# Patient Record
Sex: FEMALE | Race: WHITE | NOT HISPANIC OR LATINO | URBAN - METROPOLITAN AREA
[De-identification: names, ages, dates, MRNs, and addresses within clinical notes are randomized per-mention and may not be internally consistent; named-entity substitution may affect disease eponyms.]

---

## 2020-03-04 ENCOUNTER — EMERGENCY (EMERGENCY)
Facility: HOSPITAL | Age: 30
LOS: 0 days | Discharge: HOME | End: 2020-03-05
Attending: EMERGENCY MEDICINE | Admitting: HOSPITALIST
Payer: COMMERCIAL

## 2020-03-04 VITALS
DIASTOLIC BLOOD PRESSURE: 71 MMHG | OXYGEN SATURATION: 100 % | RESPIRATION RATE: 16 BRPM | SYSTOLIC BLOOD PRESSURE: 162 MMHG | TEMPERATURE: 99 F | HEART RATE: 120 BPM

## 2020-03-04 VITALS
OXYGEN SATURATION: 100 % | SYSTOLIC BLOOD PRESSURE: 96 MMHG | TEMPERATURE: 101 F | DIASTOLIC BLOOD PRESSURE: 48 MMHG | RESPIRATION RATE: 18 BRPM | HEART RATE: 117 BPM

## 2020-03-04 DIAGNOSIS — J11.1 INFLUENZA DUE TO UNIDENTIFIED INFLUENZA VIRUS WITH OTHER RESPIRATORY MANIFESTATIONS: ICD-10-CM

## 2020-03-04 DIAGNOSIS — R11.2 NAUSEA WITH VOMITING, UNSPECIFIED: ICD-10-CM

## 2020-03-04 DIAGNOSIS — R50.9 FEVER, UNSPECIFIED: ICD-10-CM

## 2020-03-04 LAB
ALBUMIN SERPL ELPH-MCNC: 3.7 G/DL — SIGNIFICANT CHANGE UP (ref 3.5–5.2)
ALP SERPL-CCNC: 42 U/L — SIGNIFICANT CHANGE UP (ref 30–115)
ALT FLD-CCNC: 26 U/L — SIGNIFICANT CHANGE UP (ref 0–41)
ANION GAP SERPL CALC-SCNC: 13 MMOL/L — SIGNIFICANT CHANGE UP (ref 7–14)
APPEARANCE UR: CLEAR — SIGNIFICANT CHANGE UP
AST SERPL-CCNC: 21 U/L — SIGNIFICANT CHANGE UP (ref 0–41)
BACTERIA # UR AUTO: NEGATIVE — SIGNIFICANT CHANGE UP
BASOPHILS # BLD AUTO: 0.02 K/UL — SIGNIFICANT CHANGE UP (ref 0–0.2)
BASOPHILS NFR BLD AUTO: 0.4 % — SIGNIFICANT CHANGE UP (ref 0–1)
BILIRUB SERPL-MCNC: 0.9 MG/DL — SIGNIFICANT CHANGE UP (ref 0.2–1.2)
BILIRUB UR-MCNC: NEGATIVE — SIGNIFICANT CHANGE UP
BUN SERPL-MCNC: 7 MG/DL — LOW (ref 10–20)
CALCIUM SERPL-MCNC: 8.5 MG/DL — SIGNIFICANT CHANGE UP (ref 8.5–10.1)
CHLORIDE SERPL-SCNC: 104 MMOL/L — SIGNIFICANT CHANGE UP (ref 98–110)
CO2 SERPL-SCNC: 21 MMOL/L — SIGNIFICANT CHANGE UP (ref 17–32)
COLOR SPEC: YELLOW — SIGNIFICANT CHANGE UP
CREAT SERPL-MCNC: 0.6 MG/DL — LOW (ref 0.7–1.5)
DIFF PNL FLD: NEGATIVE — SIGNIFICANT CHANGE UP
EOSINOPHIL # BLD AUTO: 0 K/UL — SIGNIFICANT CHANGE UP (ref 0–0.7)
EOSINOPHIL NFR BLD AUTO: 0 % — SIGNIFICANT CHANGE UP (ref 0–8)
EPI CELLS # UR: 1 /HPF — SIGNIFICANT CHANGE UP (ref 0–5)
FLU A RESULT: POSITIVE
FLU A RESULT: POSITIVE
FLUAV AG NPH QL: POSITIVE
FLUBV AG NPH QL: NEGATIVE — SIGNIFICANT CHANGE UP
GLUCOSE SERPL-MCNC: 78 MG/DL — SIGNIFICANT CHANGE UP (ref 70–99)
GLUCOSE UR QL: NEGATIVE — SIGNIFICANT CHANGE UP
HCT VFR BLD CALC: 33.7 % — LOW (ref 37–47)
HGB BLD-MCNC: 12.1 G/DL — SIGNIFICANT CHANGE UP (ref 12–16)
HYALINE CASTS # UR AUTO: 1 /LPF — SIGNIFICANT CHANGE UP (ref 0–7)
IMM GRANULOCYTES NFR BLD AUTO: 1.4 % — HIGH (ref 0.1–0.3)
KETONES UR-MCNC: ABNORMAL
LEUKOCYTE ESTERASE UR-ACNC: NEGATIVE — SIGNIFICANT CHANGE UP
LIDOCAIN IGE QN: 12 U/L — SIGNIFICANT CHANGE UP (ref 7–60)
LYMPHOCYTES # BLD AUTO: 0.49 K/UL — LOW (ref 1.2–3.4)
LYMPHOCYTES # BLD AUTO: 10.1 % — LOW (ref 20.5–51.1)
MAGNESIUM SERPL-MCNC: 1.7 MG/DL — LOW (ref 1.8–2.4)
MCHC RBC-ENTMCNC: 30.6 PG — SIGNIFICANT CHANGE UP (ref 27–31)
MCHC RBC-ENTMCNC: 35.9 G/DL — SIGNIFICANT CHANGE UP (ref 32–37)
MCV RBC AUTO: 85.3 FL — SIGNIFICANT CHANGE UP (ref 81–99)
MONOCYTES # BLD AUTO: 0.48 K/UL — SIGNIFICANT CHANGE UP (ref 0.1–0.6)
MONOCYTES NFR BLD AUTO: 9.9 % — HIGH (ref 1.7–9.3)
NEUTROPHILS # BLD AUTO: 3.81 K/UL — SIGNIFICANT CHANGE UP (ref 1.4–6.5)
NEUTROPHILS NFR BLD AUTO: 78.2 % — HIGH (ref 42.2–75.2)
NITRITE UR-MCNC: NEGATIVE — SIGNIFICANT CHANGE UP
NRBC # BLD: 0 /100 WBCS — SIGNIFICANT CHANGE UP (ref 0–0)
PH UR: 6.5 — SIGNIFICANT CHANGE UP (ref 5–8)
PLATELET # BLD AUTO: 159 K/UL — SIGNIFICANT CHANGE UP (ref 130–400)
POTASSIUM SERPL-MCNC: 3.9 MMOL/L — SIGNIFICANT CHANGE UP (ref 3.5–5)
POTASSIUM SERPL-SCNC: 3.9 MMOL/L — SIGNIFICANT CHANGE UP (ref 3.5–5)
PROT SERPL-MCNC: 5.8 G/DL — LOW (ref 6–8)
PROT UR-MCNC: ABNORMAL
RBC # BLD: 3.95 M/UL — LOW (ref 4.2–5.4)
RBC # FLD: 12.9 % — SIGNIFICANT CHANGE UP (ref 11.5–14.5)
RBC CASTS # UR COMP ASSIST: 2 /HPF — SIGNIFICANT CHANGE UP (ref 0–4)
RSV RESULT: NEGATIVE — SIGNIFICANT CHANGE UP
RSV RNA RESP QL NAA+PROBE: NEGATIVE — SIGNIFICANT CHANGE UP
SODIUM SERPL-SCNC: 138 MMOL/L — SIGNIFICANT CHANGE UP (ref 135–146)
SP GR SPEC: 1.03 — HIGH (ref 1.01–1.02)
UROBILINOGEN FLD QL: SIGNIFICANT CHANGE UP
WBC # BLD: 4.87 K/UL — SIGNIFICANT CHANGE UP (ref 4.8–10.8)
WBC # FLD AUTO: 4.87 K/UL — SIGNIFICANT CHANGE UP (ref 4.8–10.8)
WBC UR QL: 2 /HPF — SIGNIFICANT CHANGE UP (ref 0–5)

## 2020-03-04 PROCEDURE — 99285 EMERGENCY DEPT VISIT HI MDM: CPT

## 2020-03-04 PROCEDURE — 76705 ECHO EXAM OF ABDOMEN: CPT | Mod: 26

## 2020-03-04 PROCEDURE — 76815 OB US LIMITED FETUS(S): CPT | Mod: 26

## 2020-03-04 RX ORDER — METOCLOPRAMIDE HCL 10 MG
10 TABLET ORAL ONCE
Refills: 0 | Status: COMPLETED | OUTPATIENT
Start: 2020-03-04 | End: 2020-03-04

## 2020-03-04 RX ORDER — SODIUM CHLORIDE 9 MG/ML
1000 INJECTION INTRAMUSCULAR; INTRAVENOUS; SUBCUTANEOUS ONCE
Refills: 0 | Status: COMPLETED | OUTPATIENT
Start: 2020-03-04 | End: 2020-03-04

## 2020-03-04 RX ORDER — SODIUM CHLORIDE 9 MG/ML
2000 INJECTION INTRAMUSCULAR; INTRAVENOUS; SUBCUTANEOUS ONCE
Refills: 0 | Status: COMPLETED | OUTPATIENT
Start: 2020-03-04 | End: 2020-03-04

## 2020-03-04 RX ORDER — METOCLOPRAMIDE HCL 10 MG
1 TABLET ORAL
Qty: 14 | Refills: 0
Start: 2020-03-04 | End: 2020-03-10

## 2020-03-04 RX ORDER — ONDANSETRON 8 MG/1
4 TABLET, FILM COATED ORAL ONCE
Refills: 0 | Status: COMPLETED | OUTPATIENT
Start: 2020-03-04 | End: 2020-03-04

## 2020-03-04 RX ORDER — MAGNESIUM OXIDE 400 MG ORAL TABLET 241.3 MG
400 TABLET ORAL ONCE
Refills: 0 | Status: COMPLETED | OUTPATIENT
Start: 2020-03-04 | End: 2020-03-04

## 2020-03-04 RX ORDER — ACETAMINOPHEN 500 MG
975 TABLET ORAL ONCE
Refills: 0 | Status: COMPLETED | OUTPATIENT
Start: 2020-03-04 | End: 2020-03-04

## 2020-03-04 RX ADMIN — Medication 104 MILLIGRAM(S): at 23:12

## 2020-03-04 RX ADMIN — SODIUM CHLORIDE 1000 MILLILITER(S): 9 INJECTION INTRAMUSCULAR; INTRAVENOUS; SUBCUTANEOUS at 13:57

## 2020-03-04 RX ADMIN — Medication 75 MILLIGRAM(S): at 18:17

## 2020-03-04 RX ADMIN — MAGNESIUM OXIDE 400 MG ORAL TABLET 400 MILLIGRAM(S): 241.3 TABLET ORAL at 18:16

## 2020-03-04 RX ADMIN — SODIUM CHLORIDE 1000 MILLILITER(S): 9 INJECTION INTRAMUSCULAR; INTRAVENOUS; SUBCUTANEOUS at 22:51

## 2020-03-04 RX ADMIN — SODIUM CHLORIDE 1000 MILLILITER(S): 9 INJECTION INTRAMUSCULAR; INTRAVENOUS; SUBCUTANEOUS at 16:50

## 2020-03-04 RX ADMIN — Medication 975 MILLIGRAM(S): at 16:05

## 2020-03-04 RX ADMIN — ONDANSETRON 4 MILLIGRAM(S): 8 TABLET, FILM COATED ORAL at 15:51

## 2020-03-04 RX ADMIN — SODIUM CHLORIDE 2000 MILLILITER(S): 9 INJECTION INTRAMUSCULAR; INTRAVENOUS; SUBCUTANEOUS at 18:17

## 2020-03-04 RX ADMIN — Medication 975 MILLIGRAM(S): at 17:51

## 2020-03-04 RX ADMIN — SODIUM CHLORIDE 1000 MILLILITER(S): 9 INJECTION INTRAMUSCULAR; INTRAVENOUS; SUBCUTANEOUS at 16:49

## 2020-03-04 NOTE — CONSULT NOTE ADULT - ASSESSMENT
30yo  at 19w2d GA with influenza A and likely physiological tachycardia, currently clinically and hemodynamically stable, requiring no acute OB interventions       INCOMPLETE: RECOMMENDATIONS TO FOLLOW 30yo  at 19w2d GA with influenza A and likely physiological tachycardia, currently clinically and hemodynamically stable, requiring no acute OB interventions   -recommend tamiflu for influenza A  -recommend zofran and reglan prn for nausea  -encourage PO maternal hydration    -follow up with PMD at next scheduled prenatal appointment   -dispo per ED         Dr. Barber and Dr. Canales aware 30yo  at 19w2d GA with influenza A and likely physiological tachycardia, currently clinically and hemodynamically stable, requiring no acute OB interventions and may be discharged from an obstetrical standpoint   -recommend tamiflu for influenza A  -recommend zofran and reglan prn for nausea  -encourage PO maternal hydration    -follow up with PMD at next scheduled prenatal appointment   -dispo per ED         Dr. Barber and Dr. Canales aware 30yo  at 19w2d GA with influenza A and likely physiological tachycardia, currently clinically and hemodynamically stable, requiring no acute OB interventions and may be discharged from an obstetrical standpoint   -recommend tamiflu for influenza A  -recommend zofran and reglan prn for nausea  -encourage PO maternal hydration    -follow up with PMD at next scheduled prenatal appointment   -dispo per ED       Dr. Barber and Dr. Canales aware

## 2020-03-04 NOTE — ED PROVIDER NOTE - OBJECTIVE STATEMENT
29y F  19wk followed by Dr. Canales presents for eval of fever. Pt has 1 day of fever tmax 103f, relived with tylenol, assoicated n/v, aggravated with eating. Endorses body aching and R sided pelvic pain. Denies ha, cp, sob, weakness, numbness, dc, bleeding, dysuria, cough

## 2020-03-04 NOTE — ED PROVIDER NOTE - ATTENDING CONTRIBUTION TO CARE
28 yo f, 19 weeks pregnant, , sent by dr. navarro for fever x 1, improving with tylenol.  also with n/v.  +diffuse body aches and mild lower abd pain.   mild cough.  no sob, no cp, no vaginal bleeding or discharge.  exam: nad, ncat, perrl, eomi, mmm, rrr, ctab, abd soft, gravid, mildly ttp rlq, no rebound, no guarding, no cvat imp: pt with fever x 1 day.  r/o flu, ob consult

## 2020-03-04 NOTE — ED PROVIDER NOTE - PHYSICAL EXAMINATION
CONST: Pt appears uncomfortable  EYES: Sclera and conjunctiva clear.   ENT: Clear nasal discharge. Oropharynx normal appearing, no erythema or exudates. No abscess or swelling. Uvula midline.   NECK: Non-tender, no meningeal signs. normal ROM. supple   CARD: Tachycardic S1 S2; No jvd  RESP: Equal BS B/L, No wheezes, rhonchi or rales. No distress  GI: RLQ tenderness with deep palpation. Soft, non-tender, non-distended. no cva tenderness. normal BS  MS: Normal ROM in all extremities. pulses 2 +. no calf tenderness or swelling  SKIN: Warm, dry, no acute rashes. Good turgor  NEURO: A&Ox4

## 2020-03-04 NOTE — ED PROVIDER NOTE - CLINICAL SUMMARY MEDICAL DECISION MAKING FREE TEXT BOX
ADDENDUM by Eduar Dominguez M.D.: I received sign-off from Dr. LINDSAY Worrell. 29yoF  at 19wks' gestation presents with flu, unremarkable RLQ sono (and no suspicion for appe at this point) and OB US, I was to f/u OB assessment. OB resident assessed pt and d/w attending and state no indication for admission and pt should be discharged from an OB standpoint; confirmed this with Dr. Fairbanks of OB. On my assessment pt states all symptoms resolved and no concerns at this point and would like to go home, does not want to be admitted. On exam, NAD, very well appearing, no increased WOB, lungs CTAB no wrr, abd soft entirely NT, MMM, skin warm/well perfused. Reviewed labs/studies. Abdominal exam low suspicion for appe. No e/o PNA on exam. Patient is well appearing, NAD, afebrile, hemodynamically stable. Well hydrated, well appearing, no more emesis. Discharged with rx tamiflu, instructions in further symptomatic care, strict return precautions, and need for OB f/u.

## 2020-03-04 NOTE — ED PROVIDER NOTE - PATIENT PORTAL LINK FT
You can access the FollowMyHealth Patient Portal offered by [facility] by registering at the following website: [email or web address]. By joining [employer]’s FollowMyHealth portal, you will also be able to view your health information using other applications (apps) compatible with our system. You can access the FollowMyHealth Patient Portal offered by [facility] by registering at the following website: [email or web address]. By joining [employer]’s FollowMyHealth portal, you will also be able to view your health information using other applications (apps) compatible with our system.

## 2020-03-04 NOTE — ED ADULT NURSE NOTE - OBJECTIVE STATEMENT
Pt presesnts with fever, nausea, vomiting, cough, general weakness/bodyaches. Fever Tmax 101 at home today-relieved with tylenol at 10am. Pt is 19 wks pregnant.

## 2020-03-04 NOTE — ED PROVIDER NOTE - PROGRESS NOTE DETAILS
As per OBGYN the pt vitals are WNL and she is to be dc with fu with her OB Dr. Canales.  Dr. Canales aware and agrees as per OBGYN resident

## 2020-03-04 NOTE — ED PROVIDER NOTE - NS ED ROS FT
Constitutional: (+) fever, (+) malaise  Eyes/ENT: (-) blurry vision, (-) epistaxis  Cardiovascular: (-) chest pain, (-) syncope  Respiratory: (-) cough, (-) shortness of breath  Gastrointestinal: (+) vomiting, (-) diarrhea  : (+) pelvic pain, (-) dysuria, (-) dc, (-) bleeding  Musculoskeletal: (-) neck pain, (-) back pain, (-) joint pain  Integumentary: (-) rash, (-) edema  Neurological: (-) headache, (-) altered mental status  Allergic/Immunologic: (-) pruritus

## 2020-03-04 NOTE — ED PROVIDER NOTE - NSFOLLOWUPINSTRUCTIONS_ED_ALL_ED_FT
Follow up with PMD and OBGYN in 1-2 days.    Influenza    Influenza, more commonly known as "the flu," is a viral infection that primarily affects the respiratory tract. The respiratory tract includes organs that help you breathe, such as the lungs, nose, and throat. The flu causes many common cold symptoms, as well as a high fever and body aches.     The flu spreads easily from person to person (is contagious). Getting a flu shot (influenza vaccination) every year is the best way to prevent influenza.    CAUSES  Influenza is caused by a virus. You can catch the virus by:    Breathing in droplets from an infected person's cough or sneeze.  Touching something that was recently contaminated with the virus and then touching your mouth, nose, or eyes.    RISK FACTORS  The following factors may make you more likely to get the flu:    Not cleaning your hands frequently with soap and water or alcohol-based hand .  Having close contact with many people during cold and flu season.  Touching your mouth, eyes, or nose without washing or sanitizing your hands first.  Not drinking enough fluids or not eating a healthy diet.  Not getting enough sleep or exercise.  Being under a high amount of stress.  Not getting a yearly (annual) flu shot.    You may be at a higher risk of complications from the flu, such as a severe lung infection (pneumonia), if you:    Are over the age of 65.  Are pregnant.  Have a weakened disease-fighting system (immune system). You may have a weakened immune system if you:  Have HIV or AIDS.  Are undergoing chemotherapy.  Are taking medicines that reduce the activity of (suppress) the immune system.  Have a long-term (chronic) illness, such as heart disease, kidney disease, diabetes, or lung disease.  Have a liver disorder.  Are obese.  Have anemia.    SYMPTOMS  Symptoms of this condition typically last 4–10 days and may include:    Fever.  Chills.  Headache, body aches, or muscle aches.  Sore throat.  Cough.  Runny or congested nose.  Chest discomfort and cough.  Poor appetite.  Weakness or tiredness (fatigue).  Dizziness.  Nausea or vomiting.    DIAGNOSIS  This condition may be diagnosed based on your medical history and a physical exam. Your health care provider may do a nose or throat swab test to confirm the diagnosis.    TREATMENT  If influenza is detected early, you can be treated with antiviral medicine that can reduce the length of your illness and the severity of your symptoms. This medicine may be given by mouth (orally) or through an IV tube that is inserted in one of your veins.    The goal of treatment is to relieve symptoms by taking care of yourself at home. This may include taking over-the-counter medicines, drinking plenty of fluids, and adding humidity to the air in your home.    In some cases, influenza goes away on its own. Severe influenza or complications from influenza may be treated in a hospital.     HOME CARE INSTRUCTIONS  Take over-the-counter and prescription medicines only as told by your health care provider.  Use a cool mist humidifier to add humidity to the air in your home. This can make breathing easier.  Rest as needed.  Drink enough fluid to keep your urine clear or pale yellow.  Cover your mouth and nose when you cough or sneeze.  Wash your hands with soap and water often, especially after you cough or sneeze. If soap and water are not available, use hand .  Stay home from work or school as told by your health care provider. Unless you are visiting your health care provider, try to avoid leaving home until your fever has been gone for 24 hours without the use of medicine.  Keep all follow-up visits as told by your health care provider. This is important.    PREVENTION  Getting an annual flu shot is the best way to avoid getting the flu. You may get the flu shot in late summer, fall, or winter. Ask your health care provider when you should get your flu shot.  Wash your hands often or use hand  often.  Avoid contact with people who are sick during cold and flu season.  Eat a healthy diet, drink plenty of fluids, get enough sleep, and exercise regularly.    SEEK MEDICAL CARE IF:  You develop new symptoms.  You have:  Chest pain.  Diarrhea.  A fever.  Your cough gets worse.  You produce more mucus.  You feel nauseous or you vomit.    SEEK IMMEDIATE MEDICAL CARE IF:  You develop shortness of breath or difficulty breathing.  Your skin or nails turn a bluish color.  You have severe pain or stiffness in your neck.  You develop a sudden headache or sudden pain in your face or ear.  You cannot stop vomiting.    ADDITIONAL NOTES AND INSTRUCTIONS    Please follow up with your Primary MD in 24-48 hr.  Seek immediate medical care for any new/worsening signs or symptoms.

## 2020-03-04 NOTE — CONSULT NOTE ADULT - SUBJECTIVE AND OBJECTIVE BOX
Chief Complaint: fever, chills, muscles aches     HPI: 28yo  at 19w2d GA dated by LMP 10/21/19 presents to the ED for evaluation of fever, chills, N/V, and muscle aches for two days duration. Pt reports intermittent fevers up 101.5F that have responded to tyenol. Pt reports intermittent nausea and vomiting, but has been able to keep down small meals. Last PO intake 1800.     Pt reports fetal quickening. Pt denies abdominal cramping, LOF, VB, or contractions. Pt follows with Dr. Canales and Dr. Prince.   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     Ob/Gyn History:              LMP -10/21/19                   Cycle Length -5 days, [id number]   Denies history of ovarian cysts, uterine fibroids, abnormal paps, or STIs  Last Pap Smear -2019      Denies the following: constitutional symptoms, visual symptoms, cardiovascular symptoms, respiratory symptoms, GI symptoms, musculoskeletal symptoms, skin symptoms, neurologic symptoms, hematologic symptoms, allergic symptoms, psychiatric symptoms  Except any pertinent positives listed.     Home Medications: PNV    Allergies: No Known Allergies    Intolerances: None     PAST MEDICAL & SURGICAL HISTORY: Denies any relevant medical or surgical history    FAMILY HISTORY: Family history of anaphylaxis (with death) to morphine    SOCIAL HISTORY: Denies cigarette use, alcohol use, or illicit drug use    Vital Signs Last 24 Hrs  T(F): 102.4 (04 Mar 2020 22:32), Max: 102.4 (04 Mar 2020 22:32)  HR: 115 (04 Mar 2020 19:59) (106 - 120); bedside HR-109  BP: 100/58 (04 Mar 2020 19:59) (94/44 - 162/71)  RR: 18 (04 Mar 2020 19:59) (16 - 18)    General Appearance - AAOx3, NAD  Heart - S1S2 regular rate and rhythm  Lung - CTA Bilaterally  Abdomen - Soft, nontender, nondistended, no rebound, no rigidity, no guarding, bowel sounds present    GYN/Pelvis: Deferred, no obstetrical complaints     LABS:                        12.1   4.87  )-----------( 159      ( 04 Mar 2020 13:55 )             33.7       03-04    138  |  104  |  7<L>  ----------------------------<  78  3.9   |  21  |  0.6<L>    Ca    8.5      04 Mar 2020 13:55  Mg     1.7     03-04    TPro  5.8<L>  /  Alb  3.7  /  TBili  0.9  /  DBili  x   /  AST  21  /  ALT  26  /  AlkPhos  42  03-04      Urinalysis Basic - ( 04 Mar 2020 13:28 )    Color: Yellow / Appearance: Clear / S.028 / pH: x  Gluc: x / Ketone: Large  / Bili: Negative / Urobili: <2 mg/dL   Blood: x / Protein: 30 mg/dL / Nitrite: Negative   Leuk Esterase: Negative / RBC: 2 /HPF / WBC 2 /HPF   Sq Epi: x / Non Sq Epi: 1 /HPF / Bacteria: Negative          RADIOLOGY & ADDITIONAL STUDIES:  < from: US OB Fetus Limited (20 @ 16:03) >  EXAM:  US OB LTD FETUS(ES)        PROCEDURE DATE:  2020   INTERPRETATION:  CLINICAL HISTORY / REASON FOR EXAM: Right lower quadrant pain in a pregnant patient with fever.    TECHNIQUE: Ultrasound of the pelvis was performed with transabdominal approach, including Doppler.    LMP: 2019.    FINDINGS:     There is a single intrauterine pregnancy with femoral length measuring 3 cm corresponding to a gestational age of 19 weeks and 3 days.  Fetal heart rate is 179 beats/min.     There is no adnexal mass or free pelvic fluid.    The right ovary measures 4.3 x 2.9 x 3 cm. The left ovary measures 2.9 x 1.8 x 1.8 cm.    Doppler flow is demonstrated to both ovaries.    IMPRESSION:  Single live intrauterine pregnancy corresponding to a gestational age of 19 weeks and 3 days.    Fetal heart rate at 179 beats/min.    THOMPSON PATE M.D., ATTENDING RADIOLOGIST  This document has been electronically signed. Mar  4 2020  4:32PM           < from: US Appendix (20 @ 15:39) >  EXAM:  US APPENDIX        PROCEDURE DATE:  2020      INTERPRETATION:  Clinical History / Reason for exam: Right lower quadrant pain.    TECHNIQUE: Dedicated ultrasound of the right lower quadrant for appendix.    COMPARISON: None    FINDINGS/  impression:    The appendix is not visualized.    There is no evidence of free fluid in the right lower quadrant.  THOMPSON PATE M.D., ATTENDING RADIOLOGIST  This document has been electronically signed. Mar  4 2020  4:28PM  < end of copied text > Chief Complaint: fever, chills, muscles aches     HPI: 30yo  at 19w2d GA dated by LMP 10/21/19 presents to the ED for evaluation of fever, chills, N/V, and muscle aches for two days duration. Pt reports intermittent fevers at home relieved by tyenol. Pt reports intermittent nausea and vomiting, but has been able to keep down small meals. Last PO intake 1800.     Pt reports fetal quickening. Pt denies abdominal cramping, LOF, VB, or contractions. Pt follows with Dr. Canales and Dr. Prince.   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     Ob/Gyn History:              LMP -10/21/19                   Cycle Length -5 days, [id number]   Denies history of ovarian cysts, uterine fibroids, abnormal paps, or STIs  Last Pap Smear -2019      Denies the following: constitutional symptoms, visual symptoms, cardiovascular symptoms, respiratory symptoms, GI symptoms, musculoskeletal symptoms, skin symptoms, neurologic symptoms, hematologic symptoms, allergic symptoms, psychiatric symptoms  Except any pertinent positives listed.     Home Medications: PNV    Allergies: No Known Allergies    Intolerances: None     PAST MEDICAL & SURGICAL HISTORY: Denies any relevant medical or surgical history    FAMILY HISTORY: Family history of anaphylaxis (with death) to morphine    SOCIAL HISTORY: Denies cigarette use, alcohol use, or illicit drug use    Vital Signs Last 24 Hrs  T(F): 102.4 (04 Mar 2020 22:32), Max: 102.4 (04 Mar 2020 22:32)  HR: 115 (04 Mar 2020 19:59) (106 - 120); bedside HR-109  BP: 100/58 (04 Mar 2020 19:59) (94/44 - 162/71)  RR: 18 (04 Mar 2020 19:59) (16 - 18)    General Appearance - AAOx3, NAD  Heart - S1S2 regular rate and rhythm  Lung - CTA Bilaterally  Abdomen - Soft, nontender, nondistended, no rebound, no rigidity, no guarding, bowel sounds present    LABS:                        12.1   4.87  )-----------( 159      ( 04 Mar 2020 13:55 )             33.7       03-04    138  |  104  |  7<L>  ----------------------------<  78  3.9   |  21  |  0.6<L>    Ca    8.5      04 Mar 2020 13:55  Mg     1.7     03-04    TPro  5.8<L>  /  Alb  3.7  /  TBili  0.9  /  DBili  x   /  AST  21  /  ALT  26  /  AlkPhos  42  03-04      Urinalysis Basic - ( 04 Mar 2020 13:28 )    Color: Yellow / Appearance: Clear / S.028 / pH: x  Gluc: x / Ketone: Large  / Bili: Negative / Urobili: <2 mg/dL   Blood: x / Protein: 30 mg/dL / Nitrite: Negative   Leuk Esterase: Negative / RBC: 2 /HPF / WBC 2 /HPF   Sq Epi: x / Non Sq Epi: 1 /HPF / Bacteria: Negative          RADIOLOGY & ADDITIONAL STUDIES:  < from: US OB Fetus Limited (20 @ 16:03) >  EXAM:  US OB LTD FETUS(ES)        PROCEDURE DATE:  2020   INTERPRETATION:  CLINICAL HISTORY / REASON FOR EXAM: Right lower quadrant pain in a pregnant patient with fever.    TECHNIQUE: Ultrasound of the pelvis was performed with transabdominal approach, including Doppler.    LMP: 2019.    FINDINGS:     There is a single intrauterine pregnancy with femoral length measuring 3 cm corresponding to a gestational age of 19 weeks and 3 days.  Fetal heart rate is 179 beats/min.     There is no adnexal mass or free pelvic fluid.    The right ovary measures 4.3 x 2.9 x 3 cm. The left ovary measures 2.9 x 1.8 x 1.8 cm.    Doppler flow is demonstrated to both ovaries.    IMPRESSION:  Single live intrauterine pregnancy corresponding to a gestational age of 19 weeks and 3 days.    Fetal heart rate at 179 beats/min.    THOMPSON PATE M.D., ATTENDING RADIOLOGIST  This document has been electronically signed. Mar  4 2020  4:32PM           < from: US Appendix (20 @ 15:39) >  EXAM:  US APPENDIX        PROCEDURE DATE:  2020      INTERPRETATION:  Clinical History / Reason for exam: Right lower quadrant pain.    TECHNIQUE: Dedicated ultrasound of the right lower quadrant for appendix.    COMPARISON: None    FINDINGS/  impression:    The appendix is not visualized.    There is no evidence of free fluid in the right lower quadrant.  THOMPSON PATE M.D., ATTENDING RADIOLOGIST  This document has been electronically signed. Mar  4 2020  4:28PM  < end of copied text >    EKG - sinus tachycardia

## 2020-03-05 LAB
CULTURE RESULTS: SIGNIFICANT CHANGE UP
SPECIMEN SOURCE: SIGNIFICANT CHANGE UP

## 2020-07-21 ENCOUNTER — INPATIENT (INPATIENT)
Facility: HOSPITAL | Age: 30
LOS: 1 days | Discharge: HOME | End: 2020-07-23
Attending: OBSTETRICS & GYNECOLOGY | Admitting: OBSTETRICS & GYNECOLOGY

## 2020-07-21 VITALS — HEART RATE: 103 BPM | DIASTOLIC BLOOD PRESSURE: 75 MMHG | SYSTOLIC BLOOD PRESSURE: 130 MMHG

## 2020-07-21 LAB
APPEARANCE UR: ABNORMAL
BACTERIA # UR AUTO: ABNORMAL
BASOPHILS # BLD AUTO: 0.04 K/UL — SIGNIFICANT CHANGE UP (ref 0–0.2)
BASOPHILS NFR BLD AUTO: 0.3 % — SIGNIFICANT CHANGE UP (ref 0–1)
BILIRUB UR-MCNC: NEGATIVE — SIGNIFICANT CHANGE UP
BLD GP AB SCN SERPL QL: SIGNIFICANT CHANGE UP
COLOR SPEC: YELLOW — SIGNIFICANT CHANGE UP
DIFF PNL FLD: ABNORMAL
EOSINOPHIL # BLD AUTO: 0.01 K/UL — SIGNIFICANT CHANGE UP (ref 0–0.7)
EOSINOPHIL NFR BLD AUTO: 0.1 % — SIGNIFICANT CHANGE UP (ref 0–8)
EPI CELLS # UR: 6 /HPF — HIGH (ref 0–5)
GLUCOSE UR QL: NEGATIVE — SIGNIFICANT CHANGE UP
HCT VFR BLD CALC: 39.1 % — SIGNIFICANT CHANGE UP (ref 37–47)
HGB BLD-MCNC: 13.2 G/DL — SIGNIFICANT CHANGE UP (ref 12–16)
HYALINE CASTS # UR AUTO: 8 /LPF — HIGH (ref 0–7)
IMM GRANULOCYTES NFR BLD AUTO: 0.9 % — HIGH (ref 0.1–0.3)
KETONES UR-MCNC: ABNORMAL
LEUKOCYTE ESTERASE UR-ACNC: ABNORMAL
LYMPHOCYTES # BLD AUTO: 1.02 K/UL — LOW (ref 1.2–3.4)
LYMPHOCYTES # BLD AUTO: 6.4 % — LOW (ref 20.5–51.1)
MCHC RBC-ENTMCNC: 28.9 PG — SIGNIFICANT CHANGE UP (ref 27–31)
MCHC RBC-ENTMCNC: 33.8 G/DL — SIGNIFICANT CHANGE UP (ref 32–37)
MCV RBC AUTO: 85.7 FL — SIGNIFICANT CHANGE UP (ref 81–99)
MONOCYTES # BLD AUTO: 0.87 K/UL — HIGH (ref 0.1–0.6)
MONOCYTES NFR BLD AUTO: 5.4 % — SIGNIFICANT CHANGE UP (ref 1.7–9.3)
NEUTROPHILS # BLD AUTO: 13.9 K/UL — HIGH (ref 1.4–6.5)
NEUTROPHILS NFR BLD AUTO: 86.9 % — HIGH (ref 42.2–75.2)
NITRITE UR-MCNC: NEGATIVE — SIGNIFICANT CHANGE UP
NRBC # BLD: 0 /100 WBCS — SIGNIFICANT CHANGE UP (ref 0–0)
PH UR: 7 — SIGNIFICANT CHANGE UP (ref 5–8)
PLATELET # BLD AUTO: 237 K/UL — SIGNIFICANT CHANGE UP (ref 130–400)
PRENATAL SYPHILIS TEST: SIGNIFICANT CHANGE UP
PROT UR-MCNC: ABNORMAL
RBC # BLD: 4.56 M/UL — SIGNIFICANT CHANGE UP (ref 4.2–5.4)
RBC # FLD: 13.3 % — SIGNIFICANT CHANGE UP (ref 11.5–14.5)
RBC CASTS # UR COMP ASSIST: 3 /HPF — SIGNIFICANT CHANGE UP (ref 0–4)
SARS-COV-2 RNA SPEC QL NAA+PROBE: SIGNIFICANT CHANGE UP
SP GR SPEC: 1.03 — HIGH (ref 1.01–1.02)
UROBILINOGEN FLD QL: ABNORMAL
WBC # BLD: 15.99 K/UL — HIGH (ref 4.8–10.8)
WBC # FLD AUTO: 15.99 K/UL — HIGH (ref 4.8–10.8)
WBC UR QL: 46 /HPF — HIGH (ref 0–5)

## 2020-07-21 RX ORDER — OXYTOCIN 10 UNIT/ML
333.33 VIAL (ML) INJECTION
Qty: 20 | Refills: 0 | Status: DISCONTINUED | OUTPATIENT
Start: 2020-07-21 | End: 2020-07-23

## 2020-07-21 RX ORDER — SODIUM CHLORIDE 9 MG/ML
1000 INJECTION, SOLUTION INTRAVENOUS
Refills: 0 | Status: DISCONTINUED | OUTPATIENT
Start: 2020-07-21 | End: 2020-07-22

## 2020-07-21 NOTE — OB PROVIDER H&P - NSHPPHYSICALEXAM_GEN_ALL_CORE
Vital Signs Last 24 Hrs  T(F): 98.4 (21 Jul 2020 18:09), Max: 98.4 (21 Jul 2020 18:09)  HR: 102 (21 Jul 2020 18:15) (102 - 103)  BP: 121/83 (21 Jul 2020 18:15) (121/83 - 130/75)  RR: 18 (21 Jul 2020 18:09) (18 - 18)    EFM: 140/moderate variability/+accels  [place]: q4min  SVE: 3/90/-1, vertex, intact per Dr. Carson  Abd: soft, nontender, gravid, palpable ctx

## 2020-07-21 NOTE — OB RN TRIAGE NOTE - NS_GESTAGE_OBGYN_ALL_OB_FT
Impression: Dry eye syndrome of bilateral lacrimal glands: H04.123. Plan: Dry eye syndrome requires lubrication of the eye with artificial tears and nighttime ointments or gels. In addition, topical and oral anti-inflammatory medications are usually necessary to treat the associated ocular irritation. Contact office if dry eye does not improve, worsens or blurs vision. Recommend 3-4 drops daily of Systane Balance and ointment at bedtime. 39w1d

## 2020-07-21 NOTE — PROCEDURE NOTE - ADDITIONAL PROCEDURE DETAILS
REDOSE  Time: 0800  Pain: 9/10  Medication: 0.25% Bupivacaine 10ml, given in small increments after negative aspiration REDOSE  Time: 0800  Pain: 9/10  Medication: 0.25% Bupivacaine 10ml, given in small increments after negative aspiration    REDOSE @ 1045  Pain: 9/10 R side pressure  V/E 9bm  Medication: Ropivacaine 0.5% 5cc/Lidocaine 2% 5cc  Given in increments after aspiration  VSS analgesia at T10 REDOSE  Time: 0800  Pain: 9/10  Medication: 0.25% Bupivacaine 10ml, given in small increments after negative aspiration    REDOSE @ 1045  Pain: 9/10 R side pressure  V/E 9 cm  Medication: Ropivacaine 0.5% 5cc/Lidocaine 2% 5cc  Given in increments after aspiration  VSS analgesia at T10

## 2020-07-21 NOTE — OB PROVIDER H&P - ASSESSMENT
30yo  @39w1d, GBS neg, pregnancy complicated by fetus with one umbilical artery smaller than the other in 3VC, in early labor.    -Admit to L&D  -Admission labs  -Cont EFM/ [place]  -Monitor vitals  -IV Hydration  -Clear liquid diet  -Pain management prn    Dr. Canales and Dr. Carson aware. 30yo  @39w1d, GBS neg, in early labor.  -Admit to L&D  -Admission labs  -Cont EFM/ [place]  -Monitor vitals  -IV Hydration  -Clear liquid diet  -Pain management prn    Dr. Canales and Dr. Carson aware.

## 2020-07-21 NOTE — OB PROVIDER H&P - ATTENDING COMMENTS
IMP: IUP @ 39.1wks, Labor    Plan: Admit, Pain management, possible pitocin augmentation, anticipated

## 2020-07-21 NOTE — OB PROVIDER H&P - NSHPLABSRESULTS_GEN_ALL_CORE
Measles: immune  Varicella: immune  Mumps: non-immune    Fragile X: neg  CF: neg  SMA: neg    GCT: 132    [place]:  33w0d: 2350g (60%), small umbilical artery in 3VC, ant placenta, MVP 5cm, BPP 8/8  29w0d: 1484g (63%), small umbilical artery in 3VC, ant placenta, MVP 4.6cm, BPP 8/8  23w0d: 756g (75%), small umbilical artery in 3VC, ant placenta, AFV nml  19w0d: 331g, small umbilical artery in 3VC, ant placenta, AFV nml, anatomy normal  13w1d: SIUP      Fetal Echo: normal

## 2020-07-21 NOTE — OB RN PATIENT PROFILE - NS PRO PT RIGHT SUPPORT PERSON
KUB:

12/7/18

 

PROVIDED CLINICAL HISTORY:  

Left lower quadrant pain. 

 

FINDINGS:  

The abdominal bowel gas pattern is nonspecific. No radiographically apparent urinary tract calculi. V
ascular calcifications overlie the pelvis. 

 

IMPRESSION:  

Nonspecific bowel gas pattern. 

 

POS: OFF
LUMBAR SPINE RADIOGRAPHS TWO VIEWS:

12/7/18

 

PROVIDED CLINICAL HISTORY:  

Back pain.

 

FINDINGS:  

Five nonribbearing lumbar type vertebral bodies are present. There is grade I anterolisthesis of L5 o
n S1. Lumbar alignment appears otherwise normal. Diffuse disc space height loss and end plate degener
ative change most conspicuously effecting L4-5 and L5-S1. Pedicles appear intact. Vascular calcificat
ions are seen. Vertebral body heights appear preserved. 

 

IMPRESSION:  

Lower lumbar spine degenerative change.

 

POS: OFF
PELVIC RADIOGRAPH:

12/7/18

 

PROVIDED CLINICAL HISTORY:  

Pelvic pain. 

 

There is no evidence for fracture or other acute osseous abnormality. Alignment appears anatomic. Claudia
nt spaces appear preserved. 

 

IMPRESSION:  

No evidence for an acute osseous abnormality or significant facet arthropathy. 

 

POS: OFF
Declines

## 2020-07-21 NOTE — OB PROVIDER H&P - HISTORY OF PRESENT ILLNESS
28yo  @39w1d, JULIO CÉSAR 2020 based on LMP 10/21/19 consistent with first trim sono, GBS neg, presents with contractions starting @0100 becoming consistent @1200 q5min with 7/10 pain. Denies VB/LOF. Good FM. Fetus found to have one umbilical artery smaller than the other in a 3VC. Denies any other complications in this pregnancy.

## 2020-07-22 LAB
AMPHET UR-MCNC: NEGATIVE — SIGNIFICANT CHANGE UP
BARBITURATES UR SCN-MCNC: NEGATIVE — SIGNIFICANT CHANGE UP
BENZODIAZ UR-MCNC: NEGATIVE — SIGNIFICANT CHANGE UP
BUPRENORPHINE SCREEN, URINE RESULT: NEGATIVE — SIGNIFICANT CHANGE UP
COCAINE METAB.OTHER UR-MCNC: NEGATIVE — SIGNIFICANT CHANGE UP
FENTANYL UR QL: NEGATIVE — SIGNIFICANT CHANGE UP
L&D DRUG SCREEN, URINE: SIGNIFICANT CHANGE UP
METHADONE UR-MCNC: NEGATIVE — SIGNIFICANT CHANGE UP
OPIATES UR-MCNC: NEGATIVE — SIGNIFICANT CHANGE UP
OXYCODONE UR-MCNC: NEGATIVE — SIGNIFICANT CHANGE UP
PCP UR-MCNC: NEGATIVE — SIGNIFICANT CHANGE UP
PROPOXYPHENE QUALITATIVE URINE RESULT: NEGATIVE — SIGNIFICANT CHANGE UP

## 2020-07-22 RX ORDER — BENZOCAINE 10 %
1 GEL (GRAM) MUCOUS MEMBRANE EVERY 6 HOURS
Refills: 0 | Status: DISCONTINUED | OUTPATIENT
Start: 2020-07-22 | End: 2020-07-23

## 2020-07-22 RX ORDER — PRAMOXINE HYDROCHLORIDE 150 MG/15G
1 AEROSOL, FOAM RECTAL EVERY 4 HOURS
Refills: 0 | Status: DISCONTINUED | OUTPATIENT
Start: 2020-07-22 | End: 2020-07-23

## 2020-07-22 RX ORDER — LANOLIN
1 OINTMENT (GRAM) TOPICAL EVERY 6 HOURS
Refills: 0 | Status: DISCONTINUED | OUTPATIENT
Start: 2020-07-22 | End: 2020-07-23

## 2020-07-22 RX ORDER — HYDROCORTISONE 1 %
1 OINTMENT (GRAM) TOPICAL EVERY 6 HOURS
Refills: 0 | Status: DISCONTINUED | OUTPATIENT
Start: 2020-07-22 | End: 2020-07-23

## 2020-07-22 RX ORDER — DIPHENHYDRAMINE HCL 50 MG
25 CAPSULE ORAL EVERY 6 HOURS
Refills: 0 | Status: DISCONTINUED | OUTPATIENT
Start: 2020-07-22 | End: 2020-07-23

## 2020-07-22 RX ORDER — SODIUM CHLORIDE 9 MG/ML
3 INJECTION INTRAMUSCULAR; INTRAVENOUS; SUBCUTANEOUS EVERY 8 HOURS
Refills: 0 | Status: DISCONTINUED | OUTPATIENT
Start: 2020-07-22 | End: 2020-07-23

## 2020-07-22 RX ORDER — OXYCODONE HYDROCHLORIDE 5 MG/1
5 TABLET ORAL
Refills: 0 | Status: DISCONTINUED | OUTPATIENT
Start: 2020-07-22 | End: 2020-07-23

## 2020-07-22 RX ORDER — SIMETHICONE 80 MG/1
80 TABLET, CHEWABLE ORAL EVERY 4 HOURS
Refills: 0 | Status: DISCONTINUED | OUTPATIENT
Start: 2020-07-22 | End: 2020-07-23

## 2020-07-22 RX ORDER — KETOROLAC TROMETHAMINE 30 MG/ML
30 SYRINGE (ML) INJECTION ONCE
Refills: 0 | Status: DISCONTINUED | OUTPATIENT
Start: 2020-07-22 | End: 2020-07-22

## 2020-07-22 RX ORDER — DIBUCAINE 1 %
1 OINTMENT (GRAM) RECTAL EVERY 6 HOURS
Refills: 0 | Status: DISCONTINUED | OUTPATIENT
Start: 2020-07-22 | End: 2020-07-23

## 2020-07-22 RX ORDER — AER TRAVELER 0.5 G/1
1 SOLUTION RECTAL; TOPICAL EVERY 4 HOURS
Refills: 0 | Status: DISCONTINUED | OUTPATIENT
Start: 2020-07-22 | End: 2020-07-23

## 2020-07-22 RX ORDER — ACETAMINOPHEN 500 MG
975 TABLET ORAL
Refills: 0 | Status: DISCONTINUED | OUTPATIENT
Start: 2020-07-22 | End: 2020-07-23

## 2020-07-22 RX ORDER — ONDANSETRON 8 MG/1
4 TABLET, FILM COATED ORAL ONCE
Refills: 0 | Status: COMPLETED | OUTPATIENT
Start: 2020-07-22 | End: 2020-07-22

## 2020-07-22 RX ORDER — OXYTOCIN 10 UNIT/ML
2 VIAL (ML) INJECTION
Qty: 30 | Refills: 0 | Status: DISCONTINUED | OUTPATIENT
Start: 2020-07-22 | End: 2020-07-22

## 2020-07-22 RX ORDER — IBUPROFEN 200 MG
600 TABLET ORAL EVERY 6 HOURS
Refills: 0 | Status: COMPLETED | OUTPATIENT
Start: 2020-07-22 | End: 2021-06-20

## 2020-07-22 RX ORDER — MAGNESIUM HYDROXIDE 400 MG/1
30 TABLET, CHEWABLE ORAL
Refills: 0 | Status: DISCONTINUED | OUTPATIENT
Start: 2020-07-22 | End: 2020-07-23

## 2020-07-22 RX ORDER — OXYCODONE HYDROCHLORIDE 5 MG/1
5 TABLET ORAL ONCE
Refills: 0 | Status: DISCONTINUED | OUTPATIENT
Start: 2020-07-22 | End: 2020-07-23

## 2020-07-22 RX ORDER — IBUPROFEN 200 MG
600 TABLET ORAL EVERY 6 HOURS
Refills: 0 | Status: DISCONTINUED | OUTPATIENT
Start: 2020-07-22 | End: 2020-07-23

## 2020-07-22 RX ADMIN — Medication 30 MILLIGRAM(S): at 13:06

## 2020-07-22 RX ADMIN — Medication 600 MILLIGRAM(S): at 23:38

## 2020-07-22 RX ADMIN — Medication 600 MILLIGRAM(S): at 23:12

## 2020-07-22 RX ADMIN — ONDANSETRON 4 MILLIGRAM(S): 8 TABLET, FILM COATED ORAL at 00:25

## 2020-07-22 RX ADMIN — Medication 2 MILLIUNIT(S)/MIN: at 04:04

## 2020-07-22 RX ADMIN — Medication 975 MILLIGRAM(S): at 16:16

## 2020-07-22 RX ADMIN — Medication 600 MILLIGRAM(S): at 17:19

## 2020-07-22 RX ADMIN — SODIUM CHLORIDE 3 MILLILITER(S): 9 INJECTION INTRAMUSCULAR; INTRAVENOUS; SUBCUTANEOUS at 16:13

## 2020-07-22 RX ADMIN — Medication 975 MILLIGRAM(S): at 20:31

## 2020-07-22 RX ADMIN — Medication 975 MILLIGRAM(S): at 20:01

## 2020-07-22 NOTE — PROGRESS NOTE ADULT - ASSESSMENT
A/P:  30yo  @39w2d, GBS neg, in labor, s/p epidural, s/p AROM  -cont EFM/toco  -clear liquid diet, IVF  -vitals per routine  -cont pitocin for augmentation  -pain management with epidural  -f/u UDS    Dr. Canales and Dr. Barber aware.

## 2020-07-22 NOTE — CHART NOTE - NSCHARTNOTEFT_GEN_A_CORE
Called to pt bedside for vaginal bleeding. Nurse reports approximately 300cc clots expressed. On exam, approximately 200cc additional clots expressed on vaginal exam (500cc total PP + 200cc at delivery). Pt minimally tachycardic but otherwise vitals wnl. Feels well. Firm uterine fundus after exam. Will continue to monitor.    Dr. Canales aware.

## 2020-07-22 NOTE — PROGRESS NOTE ADULT - ASSESSMENT
A/P:  28yo  #39w2d, GBS neg, in labor, s/p epidural, s/p AROM  -cont EFM/toco  -clear liquid diet, IVF  -vitals per routine  -pain management with epidural  -f/u UDS    Dr. Canales and Dr. Barber aware. A/P:  30yo  @39w2d, GBS neg, in labor, s/p epidural, s/p AROM  -cont EFM/toco  -clear liquid diet, IVF  -vitals per routine  -pain management with epidural  -f/u UDS    Dr. Canales and Dr. Barber aware.

## 2020-07-22 NOTE — OB PROVIDER DELIVERY SUMMARY - NSPROVIDERDELIVERYNOTE_OBGYN_ALL_OB_FT
Pt became fully dilated and pushed well over intact perineum, delivery of head in ELMO position, nose and mouth bulb suctioned on perineum, followed by delivery of shoulders and body without difficulty, live male infant, APGARs 9/9, 3-vessel cord + placenta complete, 1st degree vaginal laceration repaired with chromic, no complications

## 2020-07-23 ENCOUNTER — TRANSCRIPTION ENCOUNTER (OUTPATIENT)
Age: 30
End: 2020-07-23

## 2020-07-23 VITALS
HEART RATE: 97 BPM | DIASTOLIC BLOOD PRESSURE: 56 MMHG | SYSTOLIC BLOOD PRESSURE: 111 MMHG | RESPIRATION RATE: 18 BRPM | TEMPERATURE: 97 F

## 2020-07-23 LAB
BASOPHILS # BLD AUTO: 0.04 K/UL — SIGNIFICANT CHANGE UP (ref 0–0.2)
BASOPHILS NFR BLD AUTO: 0.3 % — SIGNIFICANT CHANGE UP (ref 0–1)
EOSINOPHIL # BLD AUTO: 0.12 K/UL — SIGNIFICANT CHANGE UP (ref 0–0.7)
EOSINOPHIL NFR BLD AUTO: 0.8 % — SIGNIFICANT CHANGE UP (ref 0–8)
HCT VFR BLD CALC: 28.9 % — LOW (ref 37–47)
HGB BLD-MCNC: 9.2 G/DL — LOW (ref 12–16)
IMM GRANULOCYTES NFR BLD AUTO: 1.2 % — HIGH (ref 0.1–0.3)
LYMPHOCYTES # BLD AUTO: 14.4 % — LOW (ref 20.5–51.1)
LYMPHOCYTES # BLD AUTO: 2.17 K/UL — SIGNIFICANT CHANGE UP (ref 1.2–3.4)
MCHC RBC-ENTMCNC: 28.7 PG — SIGNIFICANT CHANGE UP (ref 27–31)
MCHC RBC-ENTMCNC: 31.8 G/DL — LOW (ref 32–37)
MCV RBC AUTO: 90 FL — SIGNIFICANT CHANGE UP (ref 81–99)
MONOCYTES # BLD AUTO: 1.18 K/UL — HIGH (ref 0.1–0.6)
MONOCYTES NFR BLD AUTO: 7.8 % — SIGNIFICANT CHANGE UP (ref 1.7–9.3)
NEUTROPHILS # BLD AUTO: 11.36 K/UL — HIGH (ref 1.4–6.5)
NEUTROPHILS NFR BLD AUTO: 75.5 % — HIGH (ref 42.2–75.2)
NRBC # BLD: 0 /100 WBCS — SIGNIFICANT CHANGE UP (ref 0–0)
PLATELET # BLD AUTO: 219 K/UL — SIGNIFICANT CHANGE UP (ref 130–400)
RBC # BLD: 3.21 M/UL — LOW (ref 4.2–5.4)
RBC # FLD: 13.7 % — SIGNIFICANT CHANGE UP (ref 11.5–14.5)
SARS-COV-2 IGG SERPL QL IA: NEGATIVE — SIGNIFICANT CHANGE UP
SARS-COV-2 IGM SERPL IA-ACNC: <0.1 INDEX — SIGNIFICANT CHANGE UP
WBC # BLD: 15.05 K/UL — HIGH (ref 4.8–10.8)
WBC # FLD AUTO: 15.05 K/UL — HIGH (ref 4.8–10.8)

## 2020-07-23 RX ORDER — BENZOCAINE 10 %
1 GEL (GRAM) MUCOUS MEMBRANE
Qty: 0 | Refills: 0 | DISCHARGE
Start: 2020-07-23

## 2020-07-23 RX ORDER — DIBUCAINE 1 %
1 OINTMENT (GRAM) RECTAL
Qty: 0 | Refills: 0 | DISCHARGE
Start: 2020-07-23

## 2020-07-23 RX ORDER — PRAMOXINE HYDROCHLORIDE 150 MG/15G
1 AEROSOL, FOAM RECTAL
Qty: 0 | Refills: 0 | DISCHARGE
Start: 2020-07-23

## 2020-07-23 RX ORDER — HYDROCORTISONE 1 %
1 OINTMENT (GRAM) TOPICAL
Qty: 0 | Refills: 0 | DISCHARGE
Start: 2020-07-23

## 2020-07-23 RX ORDER — LANOLIN
1 OINTMENT (GRAM) TOPICAL
Qty: 0 | Refills: 0 | DISCHARGE
Start: 2020-07-23

## 2020-07-23 RX ORDER — AER TRAVELER 0.5 G/1
1 SOLUTION RECTAL; TOPICAL
Qty: 0 | Refills: 0 | DISCHARGE
Start: 2020-07-23

## 2020-07-23 RX ORDER — IBUPROFEN 200 MG
1 TABLET ORAL
Qty: 0 | Refills: 0 | DISCHARGE
Start: 2020-07-23

## 2020-07-23 RX ORDER — ACETAMINOPHEN 500 MG
3 TABLET ORAL
Qty: 0 | Refills: 0 | DISCHARGE
Start: 2020-07-23

## 2020-07-23 RX ADMIN — Medication 600 MILLIGRAM(S): at 12:34

## 2020-07-23 RX ADMIN — Medication 0.5 MILLILITER(S): at 17:36

## 2020-07-23 RX ADMIN — Medication 600 MILLIGRAM(S): at 06:10

## 2020-07-23 RX ADMIN — Medication 975 MILLIGRAM(S): at 15:14

## 2020-07-23 RX ADMIN — Medication 975 MILLIGRAM(S): at 08:50

## 2020-07-23 RX ADMIN — Medication 975 MILLIGRAM(S): at 21:00

## 2020-07-23 RX ADMIN — Medication 600 MILLIGRAM(S): at 05:38

## 2020-07-23 RX ADMIN — Medication 975 MILLIGRAM(S): at 10:34

## 2020-07-23 RX ADMIN — Medication 600 MILLIGRAM(S): at 13:20

## 2020-07-23 RX ADMIN — SODIUM CHLORIDE 3 MILLILITER(S): 9 INJECTION INTRAMUSCULAR; INTRAVENOUS; SUBCUTANEOUS at 01:31

## 2020-07-23 RX ADMIN — Medication 975 MILLIGRAM(S): at 17:00

## 2020-07-23 RX ADMIN — Medication 975 MILLIGRAM(S): at 03:14

## 2020-07-23 RX ADMIN — SODIUM CHLORIDE 3 MILLILITER(S): 9 INJECTION INTRAMUSCULAR; INTRAVENOUS; SUBCUTANEOUS at 06:22

## 2020-07-23 RX ADMIN — Medication 1 TABLET(S): at 12:34

## 2020-07-23 RX ADMIN — Medication 600 MILLIGRAM(S): at 17:35

## 2020-07-23 NOTE — DISCHARGE NOTE OB - HOSPITAL COURSE
DATE OF DISCHARGE: 20 @ 13:23    HISTORY OF PRESENT ILLNESS/HOSPITAL COURSE: HPI:  28yo  @39w1d, JULIO CÉSAR 2020 based on LMP 10/21/19 consistent with first trim sono, GBS neg, presents with contractions starting @0100 becoming consistent @1200 q5min with 7/10 pain. Denies VB/LOF. Good FM. Fetus found to have one umbilical artery smaller than the other in a 3VC. Denies any other complications in this pregnancy. (2020 18:29)    PAST MEDICAL & SURGICAL HISTORY:  No pertinent past medical history  No significant past surgical history      PROCEDURES PERFORMED:     COMPLICATIONS:      POST PARTUM COURSE:       FINAL DIAGNOSIS:      LABS:                       9.2    15.05 )-----------( 219      ( 2020 06:03 )             28.9       DISCHARGE INSTRUCTIONS:      If you have severe abdominal pain, heavy vaginal bleeding, shortness of breath or chest pain please call your doctor or come to the emergency room.   DIET:  Advance as tolerated.  ACTIVITY:  Advance as tolerated.  Pelvic rest for 6 weeks.  Nothing to be inserted into the vagina for 6 weeks, i.e. no tampons, douching, sexual relations, or tub baths.   FOLLOW UP: Make an appointment to see your doctor as instructed   PRESCRIPTIONS: Prescriptions:

## 2020-07-23 NOTE — DISCHARGE NOTE OB - MEDICATION SUMMARY - MEDICATIONS TO STOP TAKING
I will STOP taking the medications listed below when I get home from the hospital:    Reglan 10 mg oral tablet  -- 1 tab(s) by mouth prn   -- It is very important that you take or use this exactly as directed.  Do not skip doses or discontinue unless directed by your doctor.  May cause drowsiness or dizziness.  Take medication on an empty stomach 1 hour before or 2 to 3 hours after a meal unless otherwise directed by your doctor.

## 2020-07-23 NOTE — DISCHARGE NOTE OB - PATIENT PORTAL LINK FT
You can access the FollowMyHealth Patient Portal offered by [facility] by registering at the following website: [email or web address]. By joining [facility]’s FollowMyHealth portal, you will also be able to view your health information using other applications (apps) compatible with our system.

## 2020-07-23 NOTE — DISCHARGE NOTE OB - CARE PROVIDER_API CALL
Bridger Canales  OBSTETRICS AND GYNECOLOGY  [address] [place] [id number]  Phone: [phone]  Fax: [phone]  Follow Up Time:

## 2020-07-23 NOTE — DISCHARGE NOTE OB - CARE PLAN
Principal Discharge DX:	Vaginal delivery  Goal:	healthy mom & baby  Assessment and plan of treatment:	Patient admitted in labor, underwent uncomplicated vaginal delivery and had an uncomplicated postpartum course, discharged on PPD 2.  Secondary Diagnosis:	Intrauterine pregnancy

## 2020-07-23 NOTE — DISCHARGE NOTE OB - MEDICATION SUMMARY - MEDICATIONS TO TAKE
I will START or STAY ON the medications listed below when I get home from the hospital:    acetaminophen 325 mg oral tablet  -- 3 tab(s) by mouth   -- Indication: For Pain    ibuprofen 600 mg oral tablet  -- 1 tab(s) by mouth every 6 hours  -- Indication: For Pain    benzocaine 20% topical spray  -- 1 spray(s) on skin every 6 hours, As needed, for Perineal discomfort  -- Indication: For perineal pain    dibucaine 1% topical ointment  -- 1 application on skin every 6 hours, As needed, Perineal discomfort  -- Indication: For pain    hydrocortisone 1% topical cream  -- 1 application on skin every 6 hours, As needed, Moderate Pain (4-6)  -- Indication: For pain    lanolin topical ointment  -- 1 application on skin every 6 hours, As needed, nipple soreness  -- Indication: For cracked nipples    pramoxine 1% topical cream  -- 1 application on skin every 4 hours, As needed, Moderate Pain (4-6)  -- Indication: For pain    witch hazel 50% topical pad  -- 1 application on skin every 4 hours, As needed, Perineal discomfort  -- Indication: For pain

## 2020-07-23 NOTE — DISCHARGE NOTE OB - PLAN OF CARE
healthy mom & baby Patient admitted in labor, underwent uncomplicated vaginal delivery and had an uncomplicated postpartum course, discharged on PPD 2.

## 2020-07-28 DIAGNOSIS — Z3A.39 39 WEEKS GESTATION OF PREGNANCY: ICD-10-CM

## 2020-07-28 DIAGNOSIS — Z34.80 ENCOUNTER FOR SUPERVISION OF OTHER NORMAL PREGNANCY, UNSPECIFIED TRIMESTER: ICD-10-CM

## 2020-09-21 NOTE — OB RN PATIENT PROFILE - WEIGHT IN LBS
[facility]   Tertiary Survey Progress Note     Date of Service: 09/21/2020    Trauma mechanism:Fall on stairs  Time/date of injury: 9/20/20  Known Injuries:  1. Left Lateral 8 -10 fracture  Other diagnoses:   1. Recent admission for HTN   2. Recent ED visit for fall, 7/19/20, 8/11/20  3. CAD, s/p stent, x3 left, x1 right 4/2009   4. Parkinson's disease  5. Depression/Anxiety   6. Hypertension   7. CKDIII  8. Hx of embolic stroke  9. S/p stent in brain   10. Hx of anesthesia complications/ malignant hyperthermia 5/29/18  11. Hx of compression fracture to thoracic vertebra   12. Former cigarette smoker, quite 3/14/66    Procedures:  1. None     Assessment:  Vini Loya is a 82 year old male  who presents with left side pain and lower extremity weakness since he had a fall on 9/20/20. Patient is not concise on the time, he just knows it happened when his wife was at [employer]. This is the 3rd fall in 3 months he has been medically seen for. This time he was walking down the stairs and his foot slipped. He tried to turn left to catch himself and hit the left side of his head, left side of chest and arm on the stairs.    Plan:  - Tertiary exam completed. No additional injuries notes.  - No further trauma workup needed. Trauma will sign off. Please contact with any questions or concerns. Job code [id number]       Neuro/Pain/:   # Acute on pain   - Recommend Lidocaine patch and Tylenol     Pulmonary:  # Rib fractures  - Acute pain see above   - Supplemental oxygen to keep saturation above 92 %.  - Incentive spirometer while awake      Cardiovascular:    # CAD s/p stents  # Hypertension   - Monitor hemodynamic status.  - PTA: simvastatin     Musculoskeletal:  # Rib 8,9,10 fractures  # Weakness and deconditioning of critical illness   - Physical and occupational therapy consults.    Skin:  - dilgent cares to prevent skin breakdown and wound formation.      Lines/ tubes/  drains:  - PIV    Interval History   Patient has been transferred to obs. He stated he has no new pain and likes the lidocaine patches over the rib fractures.     Review of Systems   Skin: negative  Eyes: negative  Ears/Nose/Throat: negative  Respiratory: No shortness of breath, dyspnea on exertion, cough, or hemoptysis  Cardiovascular: negative  Gastrointestinal: positive for constipation  Genitourinary: positive for urgency and retention  Musculoskeletal: positive for rib fracture   Neurologic: negative  Psychiatric: negative  Hematologic/Lymphatic/Immunologic: negative  Endocrine: negative     Physical Exam   [place] Coma Scale - Total 15/15  Frailty Questionnaire: To be done for all patients age 60+  F (Fatigue): Is the patient easily fatigued? YES = 1  R (Resistance): Is the patient unable to walk one flight of stairs? NO = 0  A (Ambulation): Is the patient unable to walk one block? NO = 0  I  (Illness): Does the patient have more than five illnesses? YES = 1  L (Loss of weight): Has the patient lost more than 5% of weight in the past 6 months. NO = 0  Lost five pounds or more in the last 3 months without trying? AND/OR Unintended weight loss?  Does the patient have difficulty performing housework such as washing windows or scrubbing floors? AND Activity in a typical 24-hour day- No moderate or vigorous activity    Score: 2    Score: 0-2: Ensure appropriate therapies consulted if needed     Physical Exam  Constitutional:       General: He is not in acute distress.     Appearance: Normal appearance. He is normal weight.   HENT:      Head: Normocephalic and atraumatic.   Eyes:      Extraocular Movements: Extraocular movements intact.      Conjunctiva/sclera: Conjunctivae normal.      Pupils: Pupils are equal, round, and reactive to light.   Neck:      Musculoskeletal: Normal range of motion.   Cardiovascular:      Rate and Rhythm: Normal rate and regular rhythm.   Pulmonary:      Effort: Pulmonary effort is  normal.      Breath sounds: Normal breath sounds.   Abdominal:      General: Abdomen is flat.      Palpations: Abdomen is soft.   Musculoskeletal: Normal range of motion.      Comments: Left chest wall tenderness, lido patch in place   Skin:     General: Skin is warm and dry.      Capillary Refill: Capillary refill takes less than 2 seconds.   Neurological:      Mental Status: He is alert and oriented to person, place, and time. Mental status is at baseline.      Comments: paraesthesias to fingers bilat   Psychiatric:         Mood and Affect: Mood normal.         Behavior: Behavior normal.         Thought Content: Thought content normal.         Judgment: Judgment normal.       Temp: 98.4  F (36.9  C) Temp src: Oral BP: (!) 155/83 Pulse: 82   Resp: 12 SpO2: 96 % O2 Device: None (Room air)    There were no vitals filed for this visit.  Vital Signs with Ranges  Temp:  [98  F (36.7  C)-98.4  F (36.9  C)] 98.4  F (36.9  C)  Pulse:  [74-90] 82  Resp:  [8-25] 12  BP: (155-224)/() 155/83  SpO2:  [95 %-99 %] 96 %  No intake/output data recorded.      CHANDA Blevins  To contact the trauma service use job code pager [phone],   Numeric texts or alpha text through [facility]     210.9

## 2021-03-01 NOTE — PROCEDURE NOTE - NSPATIENTPOSTION_GEN_A_CORE
----- Message from Shira Schmidt DO sent at 2/26/2021  3:55 PM CST -----  Please notify patient Moderate sleep apnea, AHI 17, severe in REM though. Recommend sleep medicine referral and CPAP study. Dr. Chacko and staff also on thread. Thank you    sitting

## 2021-05-12 PROBLEM — Z78.9 OTHER SPECIFIED HEALTH STATUS: Chronic | Status: ACTIVE | Noted: 2020-07-21

## 2021-05-20 PROBLEM — Z00.00 ENCOUNTER FOR PREVENTIVE HEALTH EXAMINATION: Status: ACTIVE | Noted: 2021-05-20

## 2021-05-21 ENCOUNTER — APPOINTMENT (OUTPATIENT)
Dept: ANTEPARTUM | Facility: CLINIC | Age: 31
End: 2021-05-21
Payer: COMMERCIAL

## 2021-05-21 ENCOUNTER — ASOB RESULT (OUTPATIENT)
Age: 31
End: 2021-05-21

## 2021-05-21 VITALS
SYSTOLIC BLOOD PRESSURE: 110 MMHG | TEMPERATURE: 98.1 F | WEIGHT: 224 LBS | HEIGHT: 66 IN | DIASTOLIC BLOOD PRESSURE: 80 MMHG | BODY MASS INDEX: 36 KG/M2 | HEART RATE: 76 BPM

## 2021-05-21 PROCEDURE — 99072 ADDL SUPL MATRL&STAF TM PHE: CPT

## 2021-05-21 PROCEDURE — 76813 OB US NUCHAL MEAS 1 GEST: CPT | Mod: 26

## 2021-06-06 LAB
CLARI ADDITIONAL INFO: NORMAL
CLARI CHROMOSOME 13: NORMAL
CLARI CHROMOSOME 18: NORMAL
CLARI CHROMOSOME 21: NORMAL
CLARI SEX CHROMOSOMES: NORMAL
CLARITEST NIPT: NORMAL

## 2021-07-16 ENCOUNTER — INPATIENT (INPATIENT)
Facility: HOSPITAL | Age: 31
LOS: 2 days | Discharge: HOME | End: 2021-07-19
Attending: OBSTETRICS & GYNECOLOGY | Admitting: OBSTETRICS & GYNECOLOGY
Payer: COMMERCIAL

## 2021-07-16 VITALS
TEMPERATURE: 98 F | OXYGEN SATURATION: 100 % | RESPIRATION RATE: 18 BRPM | DIASTOLIC BLOOD PRESSURE: 77 MMHG | HEART RATE: 100 BPM | HEIGHT: 66 IN | SYSTOLIC BLOOD PRESSURE: 127 MMHG

## 2021-07-16 LAB
ALBUMIN SERPL ELPH-MCNC: 4.2 G/DL — SIGNIFICANT CHANGE UP (ref 3.5–5.2)
ALP SERPL-CCNC: 57 U/L — SIGNIFICANT CHANGE UP (ref 30–115)
ALT FLD-CCNC: 21 U/L — SIGNIFICANT CHANGE UP (ref 0–41)
ANION GAP SERPL CALC-SCNC: 15 MMOL/L — HIGH (ref 7–14)
APPEARANCE UR: ABNORMAL
AST SERPL-CCNC: 24 U/L — SIGNIFICANT CHANGE UP (ref 0–41)
BACTERIA # UR AUTO: ABNORMAL
BASOPHILS # BLD AUTO: 0.03 K/UL — SIGNIFICANT CHANGE UP (ref 0–0.2)
BASOPHILS NFR BLD AUTO: 0.2 % — SIGNIFICANT CHANGE UP (ref 0–1)
BILIRUB DIRECT SERPL-MCNC: 0.2 MG/DL — SIGNIFICANT CHANGE UP (ref 0–0.2)
BILIRUB INDIRECT FLD-MCNC: 0.9 MG/DL — SIGNIFICANT CHANGE UP (ref 0.2–1.2)
BILIRUB SERPL-MCNC: 1.1 MG/DL — SIGNIFICANT CHANGE UP (ref 0.2–1.2)
BILIRUB UR-MCNC: NEGATIVE — SIGNIFICANT CHANGE UP
BUN SERPL-MCNC: 6 MG/DL — LOW (ref 10–20)
CALCIUM SERPL-MCNC: 9.1 MG/DL — SIGNIFICANT CHANGE UP (ref 8.5–10.1)
CHLORIDE SERPL-SCNC: 102 MMOL/L — SIGNIFICANT CHANGE UP (ref 98–110)
CO2 SERPL-SCNC: 17 MMOL/L — SIGNIFICANT CHANGE UP (ref 17–32)
COLOR SPEC: SIGNIFICANT CHANGE UP
CREAT SERPL-MCNC: 0.5 MG/DL — LOW (ref 0.7–1.5)
DIFF PNL FLD: ABNORMAL
EOSINOPHIL # BLD AUTO: 0.06 K/UL — SIGNIFICANT CHANGE UP (ref 0–0.7)
EOSINOPHIL NFR BLD AUTO: 0.4 % — SIGNIFICANT CHANGE UP (ref 0–8)
EPI CELLS # UR: 1 /HPF — SIGNIFICANT CHANGE UP (ref 0–5)
GLUCOSE SERPL-MCNC: 87 MG/DL — SIGNIFICANT CHANGE UP (ref 70–99)
GLUCOSE UR QL: NEGATIVE — SIGNIFICANT CHANGE UP
HCG SERPL-ACNC: HIGH MIU/ML
HCT VFR BLD CALC: 40.2 % — SIGNIFICANT CHANGE UP (ref 37–47)
HGB BLD-MCNC: 13.5 G/DL — SIGNIFICANT CHANGE UP (ref 12–16)
HYALINE CASTS # UR AUTO: 1 /LPF — SIGNIFICANT CHANGE UP (ref 0–7)
IMM GRANULOCYTES NFR BLD AUTO: 0.9 % — HIGH (ref 0.1–0.3)
KETONES UR-MCNC: ABNORMAL
LACTATE SERPL-SCNC: 1 MMOL/L — SIGNIFICANT CHANGE UP (ref 0.7–2)
LEUKOCYTE ESTERASE UR-ACNC: ABNORMAL
LIDOCAIN IGE QN: 16 U/L — SIGNIFICANT CHANGE UP (ref 7–60)
LYMPHOCYTES # BLD AUTO: 1.36 K/UL — SIGNIFICANT CHANGE UP (ref 1.2–3.4)
LYMPHOCYTES # BLD AUTO: 9.7 % — LOW (ref 20.5–51.1)
MCHC RBC-ENTMCNC: 28.5 PG — SIGNIFICANT CHANGE UP (ref 27–31)
MCHC RBC-ENTMCNC: 33.6 G/DL — SIGNIFICANT CHANGE UP (ref 32–37)
MCV RBC AUTO: 85 FL — SIGNIFICANT CHANGE UP (ref 81–99)
MONOCYTES # BLD AUTO: 0.77 K/UL — HIGH (ref 0.1–0.6)
MONOCYTES NFR BLD AUTO: 5.5 % — SIGNIFICANT CHANGE UP (ref 1.7–9.3)
NEUTROPHILS # BLD AUTO: 11.68 K/UL — HIGH (ref 1.4–6.5)
NEUTROPHILS NFR BLD AUTO: 83.3 % — HIGH (ref 42.2–75.2)
NITRITE UR-MCNC: NEGATIVE — SIGNIFICANT CHANGE UP
NRBC # BLD: 0 /100 WBCS — SIGNIFICANT CHANGE UP (ref 0–0)
PH UR: 6.5 — SIGNIFICANT CHANGE UP (ref 5–8)
PLATELET # BLD AUTO: 155 K/UL — SIGNIFICANT CHANGE UP (ref 130–400)
POTASSIUM SERPL-MCNC: 4.2 MMOL/L — SIGNIFICANT CHANGE UP (ref 3.5–5)
POTASSIUM SERPL-SCNC: 4.2 MMOL/L — SIGNIFICANT CHANGE UP (ref 3.5–5)
PROT SERPL-MCNC: 6.4 G/DL — SIGNIFICANT CHANGE UP (ref 6–8)
PROT UR-MCNC: ABNORMAL
RBC # BLD: 4.73 M/UL — SIGNIFICANT CHANGE UP (ref 4.2–5.4)
RBC # FLD: 13.6 % — SIGNIFICANT CHANGE UP (ref 11.5–14.5)
RBC CASTS # UR COMP ASSIST: 5 /HPF — HIGH (ref 0–4)
SODIUM SERPL-SCNC: 134 MMOL/L — LOW (ref 135–146)
SP GR SPEC: 1 — LOW (ref 1.01–1.03)
UROBILINOGEN FLD QL: SIGNIFICANT CHANGE UP
WBC # BLD: 14.02 K/UL — HIGH (ref 4.8–10.8)
WBC # FLD AUTO: 14.02 K/UL — HIGH (ref 4.8–10.8)
WBC UR QL: 139 /HPF — HIGH (ref 0–5)

## 2021-07-16 PROCEDURE — 76815 OB US LIMITED FETUS(S): CPT | Mod: 26

## 2021-07-16 PROCEDURE — 99285 EMERGENCY DEPT VISIT HI MDM: CPT | Mod: 25

## 2021-07-16 PROCEDURE — 76770 US EXAM ABDO BACK WALL COMP: CPT | Mod: 26

## 2021-07-16 RX ORDER — OXYCODONE HYDROCHLORIDE 5 MG/1
5 TABLET ORAL EVERY 6 HOURS
Refills: 0 | Status: DISCONTINUED | OUTPATIENT
Start: 2021-07-16 | End: 2021-07-19

## 2021-07-16 RX ORDER — SODIUM CHLORIDE 9 MG/ML
1000 INJECTION, SOLUTION INTRAVENOUS
Refills: 0 | Status: DISCONTINUED | OUTPATIENT
Start: 2021-07-16 | End: 2021-07-19

## 2021-07-16 RX ORDER — CEFTRIAXONE 500 MG/1
1000 INJECTION, POWDER, FOR SOLUTION INTRAMUSCULAR; INTRAVENOUS EVERY 24 HOURS
Refills: 0 | Status: DISCONTINUED | OUTPATIENT
Start: 2021-07-17 | End: 2021-07-19

## 2021-07-16 RX ORDER — ACETAMINOPHEN 500 MG
650 TABLET ORAL ONCE
Refills: 0 | Status: COMPLETED | OUTPATIENT
Start: 2021-07-16 | End: 2021-07-16

## 2021-07-16 RX ORDER — MORPHINE SULFATE 50 MG/1
2 CAPSULE, EXTENDED RELEASE ORAL ONCE
Refills: 0 | Status: DISCONTINUED | OUTPATIENT
Start: 2021-07-16 | End: 2021-07-16

## 2021-07-16 RX ORDER — ONDANSETRON 8 MG/1
4 TABLET, FILM COATED ORAL EVERY 6 HOURS
Refills: 0 | Status: DISCONTINUED | OUTPATIENT
Start: 2021-07-16 | End: 2021-07-19

## 2021-07-16 RX ORDER — ONDANSETRON 8 MG/1
4 TABLET, FILM COATED ORAL ONCE
Refills: 0 | Status: COMPLETED | OUTPATIENT
Start: 2021-07-16 | End: 2021-07-16

## 2021-07-16 RX ORDER — SODIUM CHLORIDE 9 MG/ML
1000 INJECTION, SOLUTION INTRAVENOUS ONCE
Refills: 0 | Status: COMPLETED | OUTPATIENT
Start: 2021-07-16 | End: 2021-07-16

## 2021-07-16 RX ORDER — ACETAMINOPHEN 500 MG
650 TABLET ORAL EVERY 6 HOURS
Refills: 0 | Status: DISCONTINUED | OUTPATIENT
Start: 2021-07-16 | End: 2021-07-19

## 2021-07-16 RX ORDER — CEFTRIAXONE 500 MG/1
1000 INJECTION, POWDER, FOR SOLUTION INTRAMUSCULAR; INTRAVENOUS ONCE
Refills: 0 | Status: COMPLETED | OUTPATIENT
Start: 2021-07-16 | End: 2021-07-16

## 2021-07-16 RX ADMIN — Medication 650 MILLIGRAM(S): at 21:43

## 2021-07-16 RX ADMIN — SODIUM CHLORIDE 1000 MILLILITER(S): 9 INJECTION, SOLUTION INTRAVENOUS at 18:57

## 2021-07-16 RX ADMIN — Medication 650 MILLIGRAM(S): at 19:01

## 2021-07-16 RX ADMIN — ONDANSETRON 4 MILLIGRAM(S): 8 TABLET, FILM COATED ORAL at 19:01

## 2021-07-16 RX ADMIN — CEFTRIAXONE 100 MILLIGRAM(S): 500 INJECTION, POWDER, FOR SOLUTION INTRAMUSCULAR; INTRAVENOUS at 21:43

## 2021-07-16 NOTE — ED PROVIDER NOTE - NS ED ROS FT
Review of Systems:  	•	CONSTITUTIONAL - no fever, no diaphoresis  	•	SKIN - no rash, no lesions  	•	HEMATOLOGIC - no bleeding, no bruising  	•	EYES - no discharge, no injection  	•	ENT - no sore throat, no runny nose  	•	RESPIRATORY - no shortness of breath, no cough  	•	CARDIAC - no chest pain, no palpitations  	•	GI - no abd pain, no nausea, no vomiting, no diarrhea  	•	GENITO-URINARY - +dysuria, +hematuria, +flank pain  	•	MUSCULOSKELETAL - no joint pain, no muscle aches  	•	NEUROLOGIC - no dizziness, no headache

## 2021-07-16 NOTE — OB PROVIDER H&P - NS_OBGYNHISTORY_OBGYN_ALL_OB_FT
Obhx: FT NSVDx1, 8-14, no complications    Gynhx: Denies h/o fibroids, cysts, abnormal paps, or STIs

## 2021-07-16 NOTE — OB PROVIDER H&P - NSHPLABSRESULTS_GEN_ALL_CORE
13.5   14.02 )-----------( 155      ( 16 Jul 2021 19:00 )             40.2   07-16    134<L>  |  102  |  6<L>  ----------------------------<  87  4.2   |  17  |  0.5<L>    Ca    9.1      16 Jul 2021 19:00    TPro  6.4  /  Alb  4.2  /  TBili  1.1  /  DBili  0.2  /  AST  24  /  ALT  21  /  AlkPhos  57  07-16  Urinalysis (07.16.21 @ 20:25)   Blood, Urine: Large   pH Urine: 6.5   Glucose Qualitative, Urine: Negative   Color: Light Yellow   Urine Appearance: Slightly Turbid   Bilirubin: Negative   Ketone - Urine: Moderate   Specific Gravity: 1.005   Protein, Urine: 30 mg/dL   Urobilinogen: <2 mg/dL   Nitrite: Negative   Leukocyte Esterase Concentration: Large     < from: US Kidney and Bladder (07.16.21 @ 19:57) >    EXAM:  US KIDNEYS AND BLADDER            PROCEDURE DATE:  07/16/2021            INTERPRETATION:  CLINICAL INFORMATION: Hematuria    COMPARISON: None available.    TECHNIQUE: Sonography of the kidneys and bladder.    FINDINGS:    Right kidney: 13.0 cm. No evidence of calculus or hydronephrosis.    Left kidney:  12.2 cm. No evidence of calculus or hydronephrosis.    Urinary bladder: Underdistended bladder with prevoid volume of approximately 115 cc.  Postvoid volume is approximately 10 cc. Right ureteral jet seen. Left ureteral jet not seen, a nonspecific finding.        IMPRESSION:    No hydronephrosis.    < end of copied text >

## 2021-07-16 NOTE — ED ADULT NURSE NOTE - OBJECTIVE STATEMENT
Pt 20 weeks pregnant c/o or R sided flank pain w/ hematuria started 3 days ago. Pt reports "light pink" color, with mild burning sensation. Pain on palpations on lower back. Pt denies fever chills N/V. Pain worsens when walking.

## 2021-07-16 NOTE — OB PROVIDER H&P - NSHPPHYSICALEXAM_GEN_ALL_CORE
Vital Signs Last 24 Hrs  T(C): 36.7 (16 Jul 2021 16:50), Max: 36.7 (16 Jul 2021 16:50)  T(F): 98.1 (16 Jul 2021 16:50), Max: 98.1 (16 Jul 2021 16:50)  HR: 100 (16 Jul 2021 16:50) (100 - 100)  BP: 127/77 (16 Jul 2021 16:50) (127/77 - 127/77)  RR: 18 (16 Jul 2021 16:50) (18 - 18)  SpO2: 100% (16 Jul 2021 16:50) (100% - 100%)    Gen: AOx3, no acute distress  CVS: RRR  Lungs: CTABL  Abd: soft, gravid, non tender, no palpable contractions, mild right CVA tenderness  Ext: No edema bilaterally    Bedside sono: cephalic, post placenta, MVP 5cm, +gross fetal movement, FH 152bpm

## 2021-07-16 NOTE — ED PROVIDER NOTE - FAMILY HISTORY
Father  Still living? Unknown  Family history of hypertension, Age at diagnosis: Age Unknown  Family history of diabetes mellitus, Age at diagnosis: Age Unknown

## 2021-07-16 NOTE — ED ADULT NURSE NOTE - NSIMPLEMENTINTERV_GEN_ALL_ED
Implemented All Fall Risk Interventions:  [place] to call system. Call bell, personal items and telephone within reach. Instruct patient to call for assistance. Room bathroom lighting operational. Non-slip footwear when patient is off stretcher. Physically safe environment: no spills, clutter or unnecessary equipment. Stretcher in lowest position, wheels locked, appropriate side rails in place. Provide visual cue, wrist band, yellow gown, etc. Monitor gait and stability. Monitor for mental status changes and reorient to person, place, and time. Review medications for side effects contributing to fall risk. Reinforce activity limits and safety measures with patient and family.

## 2021-07-16 NOTE — ED PROVIDER NOTE - OBJECTIVE STATEMENT
30yF  20wks pregnant no medical history c/o b/l flank pain x1wk; constant, stable; associated w/ dysuria at end of urine stream and 2-3 days of hematuria; denies vaginal bleeding when she is not urinating; denies fever/chills, chest pain, SOB, n/v/d. OB: Dr Canales.

## 2021-07-16 NOTE — ED PROVIDER NOTE - PHYSICAL EXAMINATION
Vital Signs: Reviewed  GEN: alert, NAD, speaks full sentences  HEAD:  normocephalic, atraumatic  EYES:  PERRLA; conjunctivae without injection, drainage or discharge  ENMT:  nasal mucosa moist; mouth moist without ulcerations or lesions; throat moist without erythema, exudate, ulcerations or lesions  NECK:  supple  CARDIAC:  regular rate, normal S1 and S2, no murmurs  RESP:  respiratory rate and effort appear normal for age; lungs are clear to auscultation bilaterally; no rales or wheezes  ABDOMEN:  soft, nontender, gravid abdomen  : flank tenderness RT>LT   MUSCULOSKELETAL/NEURO:  normal movement, normal tone  SKIN:  normal skin color for age and race, well-perfused; warm and dry

## 2021-07-16 NOTE — OB PROVIDER H&P - ASSESSMENT
A/P: 31yo  at 20w0d GA, with pyelonephritis, afebrile, currently clinically and hemodynamically stable  - admit to OBGYN  - regular diet  - ceftriaxone 1g q24h  - vitals q4h  - SCDs  - PNV  - pain management PRN  - AM labs ordered    Discussed with

## 2021-07-16 NOTE — ED PROVIDER NOTE - CLINICAL SUMMARY MEDICAL DECISION MAKING FREE TEXT BOX
29yo F  20wks pregnant follows with Dr. Canales presenting with R>L flank pain, assoc nausea/vomiting, hematuria. No vaginal bleeding, abdominal pain. Well appearing, NAD, non toxic. NCAT PERRLA EOMI neck supple non tender normal wob cta bl rrr abdomen s nt nd no rebound no guarding WWPx4 neuro non focal +b/l CVA tenderness, R>L. labs imaging reviewed. sp ob eval. will admit for abx

## 2021-07-17 LAB
ANION GAP SERPL CALC-SCNC: 11 MMOL/L — SIGNIFICANT CHANGE UP (ref 7–14)
BASOPHILS # BLD AUTO: 0.02 K/UL — SIGNIFICANT CHANGE UP (ref 0–0.2)
BASOPHILS NFR BLD AUTO: 0.2 % — SIGNIFICANT CHANGE UP (ref 0–1)
BUN SERPL-MCNC: 6 MG/DL — LOW (ref 10–20)
CALCIUM SERPL-MCNC: 8.7 MG/DL — SIGNIFICANT CHANGE UP (ref 8.5–10.1)
CHLORIDE SERPL-SCNC: 106 MMOL/L — SIGNIFICANT CHANGE UP (ref 98–110)
CO2 SERPL-SCNC: 23 MMOL/L — SIGNIFICANT CHANGE UP (ref 17–32)
COVID-19 SPIKE DOMAIN AB INTERP: NEGATIVE — SIGNIFICANT CHANGE UP
COVID-19 SPIKE DOMAIN ANTIBODY RESULT: 0.4 U/ML — SIGNIFICANT CHANGE UP
CREAT SERPL-MCNC: 0.5 MG/DL — LOW (ref 0.7–1.5)
EOSINOPHIL # BLD AUTO: 0.07 K/UL — SIGNIFICANT CHANGE UP (ref 0–0.7)
EOSINOPHIL NFR BLD AUTO: 0.8 % — SIGNIFICANT CHANGE UP (ref 0–8)
GLUCOSE SERPL-MCNC: 79 MG/DL — SIGNIFICANT CHANGE UP (ref 70–99)
HCT VFR BLD CALC: 37.4 % — SIGNIFICANT CHANGE UP (ref 37–47)
HGB BLD-MCNC: 12.4 G/DL — SIGNIFICANT CHANGE UP (ref 12–16)
IMM GRANULOCYTES NFR BLD AUTO: 0.8 % — HIGH (ref 0.1–0.3)
LYMPHOCYTES # BLD AUTO: 1.55 K/UL — SIGNIFICANT CHANGE UP (ref 1.2–3.4)
LYMPHOCYTES # BLD AUTO: 18.3 % — LOW (ref 20.5–51.1)
MCHC RBC-ENTMCNC: 28.7 PG — SIGNIFICANT CHANGE UP (ref 27–31)
MCHC RBC-ENTMCNC: 33.2 G/DL — SIGNIFICANT CHANGE UP (ref 32–37)
MCV RBC AUTO: 86.6 FL — SIGNIFICANT CHANGE UP (ref 81–99)
MONOCYTES # BLD AUTO: 0.67 K/UL — HIGH (ref 0.1–0.6)
MONOCYTES NFR BLD AUTO: 7.9 % — SIGNIFICANT CHANGE UP (ref 1.7–9.3)
NEUTROPHILS # BLD AUTO: 6.09 K/UL — SIGNIFICANT CHANGE UP (ref 1.4–6.5)
NEUTROPHILS NFR BLD AUTO: 72 % — SIGNIFICANT CHANGE UP (ref 42.2–75.2)
NRBC # BLD: 0 /100 WBCS — SIGNIFICANT CHANGE UP (ref 0–0)
PLATELET # BLD AUTO: 193 K/UL — SIGNIFICANT CHANGE UP (ref 130–400)
POTASSIUM SERPL-MCNC: 4.2 MMOL/L — SIGNIFICANT CHANGE UP (ref 3.5–5)
POTASSIUM SERPL-SCNC: 4.2 MMOL/L — SIGNIFICANT CHANGE UP (ref 3.5–5)
RBC # BLD: 4.32 M/UL — SIGNIFICANT CHANGE UP (ref 4.2–5.4)
RBC # FLD: 13.6 % — SIGNIFICANT CHANGE UP (ref 11.5–14.5)
SARS-COV-2 IGG+IGM SERPL QL IA: 0.4 U/ML — SIGNIFICANT CHANGE UP
SARS-COV-2 IGG+IGM SERPL QL IA: NEGATIVE — SIGNIFICANT CHANGE UP
SARS-COV-2 RNA SPEC QL NAA+PROBE: SIGNIFICANT CHANGE UP
SODIUM SERPL-SCNC: 140 MMOL/L — SIGNIFICANT CHANGE UP (ref 135–146)
WBC # BLD: 8.47 K/UL — SIGNIFICANT CHANGE UP (ref 4.8–10.8)
WBC # FLD AUTO: 8.47 K/UL — SIGNIFICANT CHANGE UP (ref 4.8–10.8)

## 2021-07-17 RX ADMIN — Medication 1 TABLET(S): at 12:01

## 2021-07-17 RX ADMIN — Medication 650 MILLIGRAM(S): at 14:19

## 2021-07-17 RX ADMIN — OXYCODONE HYDROCHLORIDE 5 MILLIGRAM(S): 5 TABLET ORAL at 01:26

## 2021-07-17 RX ADMIN — SODIUM CHLORIDE 125 MILLILITER(S): 9 INJECTION, SOLUTION INTRAVENOUS at 01:21

## 2021-07-17 RX ADMIN — CEFTRIAXONE 100 MILLIGRAM(S): 500 INJECTION, POWDER, FOR SOLUTION INTRAMUSCULAR; INTRAVENOUS at 21:21

## 2021-07-17 RX ADMIN — SODIUM CHLORIDE 125 MILLILITER(S): 9 INJECTION, SOLUTION INTRAVENOUS at 12:02

## 2021-07-17 NOTE — PROGRESS NOTE ADULT - ASSESSMENT
A/P: 29yo  at 20w1d, with pyelonephritis, afebrile, currently clinically and hemodynamically stable.    - regular diet  - ceftriaxone 1g q24h  - vitals q4h  - SCDs  - PNV  - pain management PRN  - f/u AM labs    Dr. Canales and Dr. Fairbanks to be made aware A/P: 29yo  at 20w1d, with pyelonephritis, afebrile, currently clinically and hemodynamically stable.  - ceftriaxone 1g q24h  -diet: regular   -DVT ppx: SCDs  -indwelling urethral catheter   -continue IVF hydration   -activity: ambulate as tolerated   -encourage ambulation  -encourage PO hydration   -PNV  -f/u AM labs   -f/u pending urine culture    Dr. Fairbanks and Dr. Canales aware

## 2021-07-17 NOTE — PROGRESS NOTE ADULT - SUBJECTIVE AND OBJECTIVE BOX
Reason for Admission: suspected pyelonephritis in pregnancy    Gestational Age: 20w1d  Hospital Day: 2    Patient seen and examined at bedside, in NAD. She reports improvement in right sided flank pain, she was able to sleep last night. Pain was originally a 9/10 and currently rated at 7/10. Denies fever, chills, nausea, vomiting, abdominal pain. Denies vaginal bleeding, leakage of fluid, contractions. Reports fetal movement. Tolerating PO liquids.    Ambulating: Yes  Voiding: Yes  Flatus: Yes  Bowel movements: Yes   Diet: Regular    PAST MEDICAL & SURGICAL HISTORY:  No pertinent past medical history    No significant past surgical history    MEDICATIONS  (STANDING):  cefTRIAXone   IVPB 1000 milliGRAM(s) IV Intermittent every 24 hours  lactated ringers. 1000 milliLiter(s) (125 mL/Hr) IV Continuous <Continuous>  prenatal multivitamin 1 Tablet(s) Oral daily    MEDICATIONS  (PRN):  acetaminophen   Tablet .. 650 milliGRAM(s) Oral every 6 hours PRN Temp greater or equal to 38C (100.4F), Mild Pain (1 - 3)  ondansetron Injectable 4 milliGRAM(s) IV Push every 6 hours PRN Nausea and/or Vomiting  oxyCODONE    IR 5 milliGRAM(s) Oral every 6 hours PRN Severe Pain (7 - 10)    Physical Exam:   Vital Signs Last 24 Hrs  T(C): 36.5 (17 Jul 2021 05:16), Max: 36.7 (16 Jul 2021 16:50)  T(F): 97.7 (17 Jul 2021 05:16), Max: 98.1 (16 Jul 2021 16:50)  HR: 92 (17 Jul 2021 05:16) (91 - 100)  BP: 96/49 (17 Jul 2021 05:16) (96/49 - 127/77)  RR: 18 (17 Jul 2021 05:16) (18 - 18)  SpO2: 99% (16 Jul 2021 22:38) (99% - 100%)    Gen: AOx3, NAD   Cardio: RRR, S1S2, no m/r/g  Resp: CTA b/l  Abdomen: gravid, soft, nontender, no palpable contractions   Extremities: no pain or edema  CVA: right sided CVA tenderness, left side nontender    SVE: deferred     Labs:                        12.4   8.47  )-----------( 193      ( 17 Jul 2021 06:20 )             37.4                         13.5   14.02 )-----------( 155      ( 16 Jul 2021 19:00 )             40.2      Urinalysis (07.16.21 @ 20:25)    pH Urine: 6.5    Glucose Qualitative, Urine: Negative    Blood, Urine: Large    Color: Light Yellow    Urine Appearance: Slightly Turbid    Bilirubin: Negative    Ketone - Urine: Moderate    Specific Gravity: 1.005    Protein, Urine: 30 mg/dL    Urobilinogen: <2 mg/dL    Nitrite: Negative    Leukocyte Esterase Concentration: Large    BSS on 7/16: cephalic, post placenta, MVP 5cm, +gross fetal movement, FH 152bpm    < from: US Kidney and Bladder (07.16.21 @ 19:57) >  EXAM:  US KIDNEYS AND BLADDER            PROCEDURE DATE:  07/16/2021            INTERPRETATION:  CLINICAL INFORMATION: Hematuria    COMPARISON: None available.    TECHNIQUE: Sonography of the kidneys and bladder.    FINDINGS:    Right kidney: 13.0 cm. No evidence of calculus or hydronephrosis.    Left kidney:  12.2 cm. No evidence of calculus or hydronephrosis.    Urinary bladder: Underdistended bladder with prevoid volume of approximately 115 cc.  Postvoid volume is approximately 10 cc. Right ureteral jet seen. Left ureteral jet not seen, a nonspecific finding.        IMPRESSION:    No hydronephrosis.    < end of copied text >   Reason for Admission: pyelonephritis in pregnancy    Gestational Age: 20w1d  Hospital Day: 2    Patient seen and examined at bedside, in NAD. She reports improvement in right sided flank pain, she was able to sleep last night. Pain was originally a 9/10 and currently rated at 7/10. Denies fever, chills, nausea, vomiting, abdominal pain. Denies vaginal bleeding, leakage of fluid, contractions. Reports fetal movement. Tolerating PO liquids.    Ambulating: Yes  Voiding: Yes  Flatus: Yes  Bowel movements: Yes   Diet: Regular    PAST MEDICAL & SURGICAL HISTORY:  No pertinent past medical history    No significant past surgical history    MEDICATIONS  (STANDING):  cefTRIAXone   IVPB 1000 milliGRAM(s) IV Intermittent every 24 hours  lactated ringers. 1000 milliLiter(s) (125 mL/Hr) IV Continuous <Continuous>  prenatal multivitamin 1 Tablet(s) Oral daily    MEDICATIONS  (PRN):  acetaminophen   Tablet .. 650 milliGRAM(s) Oral every 6 hours PRN Temp greater or equal to 38C (100.4F), Mild Pain (1 - 3)  ondansetron Injectable 4 milliGRAM(s) IV Push every 6 hours PRN Nausea and/or Vomiting  oxyCODONE    IR 5 milliGRAM(s) Oral every 6 hours PRN Severe Pain (7 - 10)    Physical Exam:   Vital Signs Last 24 Hrs  T(C): 36.5 (17 Jul 2021 05:16), Max: 36.7 (16 Jul 2021 16:50)  T(F): 97.7 (17 Jul 2021 05:16), Max: 98.1 (16 Jul 2021 16:50)  HR: 92 (17 Jul 2021 05:16) (91 - 100)  BP: 96/49 (17 Jul 2021 05:16) (96/49 - 127/77)  RR: 18 (17 Jul 2021 05:16) (18 - 18)  SpO2: 99% (16 Jul 2021 22:38) (99% - 100%)    Gen: AOx3, NAD   Cardio: RRR, S1S2, no m/r/g  Resp: CTA b/l  Abdomen: gravid, soft, nontender, no palpable contractions   Extremities: no pain or edema  CVA: right sided CVA tenderness, left side nontender    SVE: deferred     Labs:                        12.4   8.47  )-----------( 193      ( 17 Jul 2021 06:20 )             37.4                         13.5   14.02 )-----------( 155      ( 16 Jul 2021 19:00 )             40.2      Urinalysis (07.16.21 @ 20:25)    pH Urine: 6.5    Glucose Qualitative, Urine: Negative    Blood, Urine: Large    Color: Light Yellow    Urine Appearance: Slightly Turbid    Bilirubin: Negative    Ketone - Urine: Moderate    Specific Gravity: 1.005    Protein, Urine: 30 mg/dL    Urobilinogen: <2 mg/dL    Nitrite: Negative    Leukocyte Esterase Concentration: Large    BSS on 7/16: cephalic, post placenta, MVP 5cm, +gross fetal movement, FH 152bpm    < from: US Kidney and Bladder (07.16.21 @ 19:57) >  EXAM:  US KIDNEYS AND BLADDER            PROCEDURE DATE:  07/16/2021            INTERPRETATION:  CLINICAL INFORMATION: Hematuria    COMPARISON: None available.    TECHNIQUE: Sonography of the kidneys and bladder.    FINDINGS:    Right kidney: 13.0 cm. No evidence of calculus or hydronephrosis.    Left kidney:  12.2 cm. No evidence of calculus or hydronephrosis.    Urinary bladder: Underdistended bladder with prevoid volume of approximately 115 cc.  Postvoid volume is approximately 10 cc. Right ureteral jet seen. Left ureteral jet not seen, a nonspecific finding.        IMPRESSION:    No hydronephrosis.    < end of copied text >

## 2021-07-18 LAB
ANION GAP SERPL CALC-SCNC: 13 MMOL/L — SIGNIFICANT CHANGE UP (ref 7–14)
BASOPHILS # BLD AUTO: 0.02 K/UL — SIGNIFICANT CHANGE UP (ref 0–0.2)
BASOPHILS NFR BLD AUTO: 0.3 % — SIGNIFICANT CHANGE UP (ref 0–1)
BUN SERPL-MCNC: 6 MG/DL — LOW (ref 10–20)
CALCIUM SERPL-MCNC: 8.3 MG/DL — LOW (ref 8.5–10.1)
CHLORIDE SERPL-SCNC: 104 MMOL/L — SIGNIFICANT CHANGE UP (ref 98–110)
CO2 SERPL-SCNC: 20 MMOL/L — SIGNIFICANT CHANGE UP (ref 17–32)
CREAT SERPL-MCNC: 0.5 MG/DL — LOW (ref 0.7–1.5)
EOSINOPHIL # BLD AUTO: 0.1 K/UL — SIGNIFICANT CHANGE UP (ref 0–0.7)
EOSINOPHIL NFR BLD AUTO: 1.4 % — SIGNIFICANT CHANGE UP (ref 0–8)
GLUCOSE SERPL-MCNC: 76 MG/DL — SIGNIFICANT CHANGE UP (ref 70–99)
HCT VFR BLD CALC: 35.5 % — LOW (ref 37–47)
HGB BLD-MCNC: 11.8 G/DL — LOW (ref 12–16)
IMM GRANULOCYTES NFR BLD AUTO: 0.7 % — HIGH (ref 0.1–0.3)
LYMPHOCYTES # BLD AUTO: 1.57 K/UL — SIGNIFICANT CHANGE UP (ref 1.2–3.4)
LYMPHOCYTES # BLD AUTO: 21.4 % — SIGNIFICANT CHANGE UP (ref 20.5–51.1)
MAGNESIUM SERPL-MCNC: 1.6 MG/DL — LOW (ref 1.8–2.4)
MCHC RBC-ENTMCNC: 28.9 PG — SIGNIFICANT CHANGE UP (ref 27–31)
MCHC RBC-ENTMCNC: 33.2 G/DL — SIGNIFICANT CHANGE UP (ref 32–37)
MCV RBC AUTO: 87 FL — SIGNIFICANT CHANGE UP (ref 81–99)
MONOCYTES # BLD AUTO: 0.54 K/UL — SIGNIFICANT CHANGE UP (ref 0.1–0.6)
MONOCYTES NFR BLD AUTO: 7.3 % — SIGNIFICANT CHANGE UP (ref 1.7–9.3)
NEUTROPHILS # BLD AUTO: 5.07 K/UL — SIGNIFICANT CHANGE UP (ref 1.4–6.5)
NEUTROPHILS NFR BLD AUTO: 68.9 % — SIGNIFICANT CHANGE UP (ref 42.2–75.2)
NRBC # BLD: 0 /100 WBCS — SIGNIFICANT CHANGE UP (ref 0–0)
PHOSPHATE SERPL-MCNC: 4.1 MG/DL — SIGNIFICANT CHANGE UP (ref 2.1–4.9)
PLATELET # BLD AUTO: 182 K/UL — SIGNIFICANT CHANGE UP (ref 130–400)
POTASSIUM SERPL-MCNC: 3.7 MMOL/L — SIGNIFICANT CHANGE UP (ref 3.5–5)
POTASSIUM SERPL-SCNC: 3.7 MMOL/L — SIGNIFICANT CHANGE UP (ref 3.5–5)
RBC # BLD: 4.08 M/UL — LOW (ref 4.2–5.4)
RBC # FLD: 13.6 % — SIGNIFICANT CHANGE UP (ref 11.5–14.5)
SODIUM SERPL-SCNC: 137 MMOL/L — SIGNIFICANT CHANGE UP (ref 135–146)
WBC # BLD: 7.35 K/UL — SIGNIFICANT CHANGE UP (ref 4.8–10.8)
WBC # FLD AUTO: 7.35 K/UL — SIGNIFICANT CHANGE UP (ref 4.8–10.8)

## 2021-07-18 RX ORDER — MAGNESIUM SULFATE 500 MG/ML
2 VIAL (ML) INJECTION ONCE
Refills: 0 | Status: COMPLETED | OUTPATIENT
Start: 2021-07-18 | End: 2021-07-18

## 2021-07-18 RX ADMIN — Medication 50 GRAM(S): at 14:18

## 2021-07-18 RX ADMIN — SODIUM CHLORIDE 125 MILLILITER(S): 9 INJECTION, SOLUTION INTRAVENOUS at 11:25

## 2021-07-18 RX ADMIN — SODIUM CHLORIDE 125 MILLILITER(S): 9 INJECTION, SOLUTION INTRAVENOUS at 03:12

## 2021-07-18 RX ADMIN — Medication 1 TABLET(S): at 11:25

## 2021-07-18 RX ADMIN — ONDANSETRON 4 MILLIGRAM(S): 8 TABLET, FILM COATED ORAL at 19:59

## 2021-07-18 RX ADMIN — ONDANSETRON 4 MILLIGRAM(S): 8 TABLET, FILM COATED ORAL at 06:54

## 2021-07-18 RX ADMIN — CEFTRIAXONE 100 MILLIGRAM(S): 500 INJECTION, POWDER, FOR SOLUTION INTRAMUSCULAR; INTRAVENOUS at 21:21

## 2021-07-18 NOTE — PROGRESS NOTE ADULT - ASSESSMENT
31yo  at 20w2d GA, with pyelonephritis, remains afebrile, on rocephin, doing well     - regular diet  - ceftriaxone 1g q24h  - vitals q4h  - SCDs  - PNV  - pain management PRN    29yo  at 20w2d GA, with pyelonephritis, remains afebrile, on rocephin, doing well     - regular diet  - ceftriaxone 1g q24h  - vitals q4h  - SCDs  - PNV  - pain management PRN  - will trend CBC, AM labs ordered      and  to be made aware

## 2021-07-18 NOTE — PROGRESS NOTE ADULT - SUBJECTIVE AND OBJECTIVE BOX
DEANNA SCHRADER  30y  Female  CC: pain with urination  PGY2 Note:  Patient seen and examined bedside. No overnight events. Reports back pain and dysuria has improved. Dysuria is felt at the end of urination and pain is rated 5 out of 10 (previously 9 out of 10 on admission). Reports nausea this AM which resolved with zofran. Denies fever, chills, vomiting. Denies vaginal bleeding, abdominal/pelvic pain, or leakage of fluid. Reports good fetal movement. Tolerating Regular Diet. Ambulating without difficulty.     PAST MEDICAL & SURGICAL HISTORY:  No pertinent past medical history    No significant past surgical history        Physical Exam  Vital Signs Last 24 Hrs  T(C): 36.6 (18 Jul 2021 05:48), Max: 36.8 (17 Jul 2021 10:00)  T(F): 97.9 (18 Jul 2021 05:48), Max: 98.2 (17 Jul 2021 10:00)  HR: 94 (18 Jul 2021 05:48) (94 - 105)  BP: 115/59 (18 Jul 2021 05:48) (115/59 - 135/56)  RR: 18 (18 Jul 2021 05:48) (18 - 19)    Gen: AAOx3, NAD  CV: RRR. No murmors gallops or rubs.  Pulm: CTAB. No wheezes or rales.  Ext: No calf tenderness, no swelling.   Abd: Soft, gravid, nontender, nondistended  : no CVA tenderness       Labs:                        12.4   8.47  )-----------( 193      ( 17 Jul 2021 06:20 )             37.4                         13.5   14.02 )-----------( 155      ( 16 Jul 2021 19:00 )             40.2        MEDICATIONS  (STANDING):  cefTRIAXone   IVPB 1000 milliGRAM(s) IV Intermittent every 24 hours  lactated ringers. 1000 milliLiter(s) (125 mL/Hr) IV Continuous <Continuous>  prenatal multivitamin 1 Tablet(s) Oral daily    MEDICATIONS  (PRN):  acetaminophen   Tablet .. 650 milliGRAM(s) Oral every 6 hours PRN Temp greater or equal to 38C (100.4F), Mild Pain (1 - 3)  ondansetron Injectable 4 milliGRAM(s) IV Push every 6 hours PRN Nausea and/or Vomiting  oxyCODONE    IR 5 milliGRAM(s) Oral every 6 hours PRN Severe Pain (7 - 10)

## 2021-07-19 ENCOUNTER — TRANSCRIPTION ENCOUNTER (OUTPATIENT)
Age: 31
End: 2021-07-19

## 2021-07-19 VITALS
DIASTOLIC BLOOD PRESSURE: 57 MMHG | SYSTOLIC BLOOD PRESSURE: 110 MMHG | TEMPERATURE: 98 F | HEART RATE: 83 BPM | RESPIRATION RATE: 18 BRPM

## 2021-07-19 LAB
ANION GAP SERPL CALC-SCNC: 11 MMOL/L — SIGNIFICANT CHANGE UP (ref 7–14)
BASOPHILS # BLD AUTO: 0.03 K/UL — SIGNIFICANT CHANGE UP (ref 0–0.2)
BASOPHILS NFR BLD AUTO: 0.4 % — SIGNIFICANT CHANGE UP (ref 0–1)
BUN SERPL-MCNC: 5 MG/DL — LOW (ref 10–20)
CALCIUM SERPL-MCNC: 8.3 MG/DL — LOW (ref 8.5–10.1)
CHLORIDE SERPL-SCNC: 105 MMOL/L — SIGNIFICANT CHANGE UP (ref 98–110)
CO2 SERPL-SCNC: 21 MMOL/L — SIGNIFICANT CHANGE UP (ref 17–32)
CREAT SERPL-MCNC: 0.5 MG/DL — LOW (ref 0.7–1.5)
EOSINOPHIL # BLD AUTO: 0.13 K/UL — SIGNIFICANT CHANGE UP (ref 0–0.7)
EOSINOPHIL NFR BLD AUTO: 1.7 % — SIGNIFICANT CHANGE UP (ref 0–8)
GLUCOSE SERPL-MCNC: 75 MG/DL — SIGNIFICANT CHANGE UP (ref 70–99)
HCT VFR BLD CALC: 34.8 % — LOW (ref 37–47)
HGB BLD-MCNC: 11.5 G/DL — LOW (ref 12–16)
IMM GRANULOCYTES NFR BLD AUTO: 0.9 % — HIGH (ref 0.1–0.3)
LYMPHOCYTES # BLD AUTO: 1.76 K/UL — SIGNIFICANT CHANGE UP (ref 1.2–3.4)
LYMPHOCYTES # BLD AUTO: 23.5 % — SIGNIFICANT CHANGE UP (ref 20.5–51.1)
MAGNESIUM SERPL-MCNC: 1.7 MG/DL — LOW (ref 1.8–2.4)
MCHC RBC-ENTMCNC: 28.5 PG — SIGNIFICANT CHANGE UP (ref 27–31)
MCHC RBC-ENTMCNC: 33 G/DL — SIGNIFICANT CHANGE UP (ref 32–37)
MCV RBC AUTO: 86.4 FL — SIGNIFICANT CHANGE UP (ref 81–99)
MONOCYTES # BLD AUTO: 0.46 K/UL — SIGNIFICANT CHANGE UP (ref 0.1–0.6)
MONOCYTES NFR BLD AUTO: 6.1 % — SIGNIFICANT CHANGE UP (ref 1.7–9.3)
NEUTROPHILS # BLD AUTO: 5.04 K/UL — SIGNIFICANT CHANGE UP (ref 1.4–6.5)
NEUTROPHILS NFR BLD AUTO: 67.4 % — SIGNIFICANT CHANGE UP (ref 42.2–75.2)
NRBC # BLD: 0 /100 WBCS — SIGNIFICANT CHANGE UP (ref 0–0)
PHOSPHATE SERPL-MCNC: 4.4 MG/DL — SIGNIFICANT CHANGE UP (ref 2.1–4.9)
PLATELET # BLD AUTO: 176 K/UL — SIGNIFICANT CHANGE UP (ref 130–400)
POTASSIUM SERPL-MCNC: 3.9 MMOL/L — SIGNIFICANT CHANGE UP (ref 3.5–5)
POTASSIUM SERPL-SCNC: 3.9 MMOL/L — SIGNIFICANT CHANGE UP (ref 3.5–5)
RBC # BLD: 4.03 M/UL — LOW (ref 4.2–5.4)
RBC # FLD: 13.4 % — SIGNIFICANT CHANGE UP (ref 11.5–14.5)
SODIUM SERPL-SCNC: 137 MMOL/L — SIGNIFICANT CHANGE UP (ref 135–146)
WBC # BLD: 7.49 K/UL — SIGNIFICANT CHANGE UP (ref 4.8–10.8)
WBC # FLD AUTO: 7.49 K/UL — SIGNIFICANT CHANGE UP (ref 4.8–10.8)

## 2021-07-19 RX ORDER — CEPHALEXIN 500 MG
1 CAPSULE ORAL
Qty: 28 | Refills: 0
Start: 2021-07-19 | End: 2021-07-25

## 2021-07-19 RX ADMIN — Medication 1 TABLET(S): at 11:50

## 2021-07-19 RX ADMIN — SODIUM CHLORIDE 125 MILLILITER(S): 9 INJECTION, SOLUTION INTRAVENOUS at 03:16

## 2021-07-19 NOTE — DISCHARGE NOTE OB - MEDICATION SUMMARY - MEDICATIONS TO TAKE
I will START or STAY ON the medications listed below when I get home from the hospital:    Keflex 500 mg oral capsule  -- 1 cap(s) by mouth every 6 hours   -- Finish all this medication unless otherwise directed by prescriber.    -- Indication: For infection

## 2021-07-19 NOTE — PROGRESS NOTE ADULT - ASSESSMENT
31 y/o  at 20w3d, with pyelonephritis, with flank pain now resolved, currently afebrile, on rocephin, doing well   - regular diet  - ceftriaxone 1g q24h  - vitals q4h  - SCDs  - PNV  - pain management PRN  - f/u AM labs   - f/u urine culture  - anticipate discharge home today after 4th dose of rocephin     and Dr. Fairbanks to be made aware

## 2021-07-19 NOTE — DISCHARGE NOTE OB - CARE PROVIDER_API CALL
Bridger Canales)  Obstetrics and Gynecology  [address] [place] [address]  Phone: [phone]  Fax: [phone]  Follow Up Time: 2 weeks

## 2021-07-19 NOTE — DISCHARGE NOTE OB - HOSPITAL COURSE
21 @ 06:24    HPI:  31yo  at 20w0d GA, EDC 12/3/21 by first trimester sono, presenting to the ED with dysuria, hematuria, urinary urgency, frequency x2 days. Associated with chills and right sided back pain, 6/10 in intensity. Reports nausea and 3 episodes of NBNB vomiting today.Denies fever, chills, CP, SOB, N/V, abdominal pain, contractions, vaginal bleeding, or LE pain/swelling. Good FM. Denies any complications with this pregnancy. Last PO intake at 7pm. (2021 22:38)      PAST MEDICAL & SURGICAL HISTORY:  No pertinent past medical history    No significant past surgical history        POST PARTUM COURSE:   Patient has remained afebrile through hospital course, currently right flank pain and dysuria are resolved. Will discharge after receiving 4th dose of rocephin.        LABS:                        11.8   7.35  )-----------( 182      ( 2021 05:53 )             35.5       21 @ 05:53      137  |  104  |  6<L>  ----------------------------<  76  3.7   |  20  |  0.5<L>    21 @ 06:20      140  |  106  |  6<L>  ----------------------------<  79  4.2   |  23  |  0.5<L>    21 @ 19:00      134<L>  |  102  |  6<L>  ----------------------------<  87  4.2   |  17  |  0.5<L>        Ca    8.3<L>      2021 05:53  Ca    8.7      2021 06:20  Ca    9.1      2021 19:00  Phos  4.1       Mg     1.6<L>         TPro  6.4  /  Alb  4.2  /  TBili  1.1  /  DBili  0.2  /  AST  24  /  ALT  21  /  AlkPhos  57  21 @ 19:00        Allergies    No Known Allergies    Intolerances             21 @ 06:24    HPI:  29yo  at 20w0d GA, EDC 12/3/21 by first trimester sono, presenting to the ED with dysuria, hematuria, urinary urgency, frequency x2 days. Associated with chills and right sided back pain, 6/10 in intensity. Reports nausea and 3 episodes of NBNB vomiting today.Denies fever, chills, CP, SOB, N/V, abdominal pain, contractions, vaginal bleeding, or LE pain/swelling. Good FM. Denies any complications with this pregnancy. Last PO intake at 7pm. (2021 22:38)      PAST MEDICAL & SURGICAL HISTORY:  No pertinent past medical history    No significant past surgical history        POST PARTUM COURSE:   Patient has remained afebrile through hospital course, currently right flank pain and dysuria are resolved. Discharge on keflex 500mg 4 times a day for 7 days. Patient to follow up with PMD as scheduled for prenatal visit       LABS:                        11.8   7.35  )-----------( 182      ( 2021 05:53 )             35.5       21 @ 05:53      137  |  104  |  6<L>  ----------------------------<  76  3.7   |  20  |  0.5<L>    21 @ 06:20      140  |  106  |  6<L>  ----------------------------<  79  4.2   |  23  |  0.5<L>    21 @ 19:00      134<L>  |  102  |  6<L>  ----------------------------<  87  4.2   |  17  |  0.5<L>        Ca    8.3<L>      2021 05:53  Ca    8.7      2021 06:20  Ca    9.1      2021 19:00  Phos  4.1       Mg     1.6<L>         TPro  6.4  /  Alb  4.2  /  TBili  1.1  /  DBili  0.2  /  AST  24  /  ALT  21  /  AlkPhos  57  21 @ 19:00        Allergies    No Known Allergies    Intolerances

## 2021-07-19 NOTE — DISCHARGE NOTE OB - CARE PLAN
Principal Discharge DX:	Pyelonephritis  Goal:	healthy recovery  Assessment and plan of treatment:	F/u with PMD in 2 weeks   Principal Discharge DX:	Pyelonephritis  Goal:	healthy recovery  Assessment and plan of treatment:	Take keflex 500mg 4 times a day (every 6 hours) for 7 days  F/u with Dr. Canales as scheduled for prenatal appointment

## 2021-07-19 NOTE — PROGRESS NOTE ADULT - ATTENDING COMMENTS
IMP: IUP @ 20.3 wks, pyelo - resolving    Plan: dc home, Keflex 500 QID x 7 days, f/u culture results, f/u office - 1 wk
as above, pt seen at bedside, agree with findings, impression and plan    IMP: IUP @ 20.1 wks, Pyelo    Plan: Continue IV-Abx, Pain management, Bed Rest, Regular Diet, f/u labs/cultures
IMP: IUP @ 20.2wk, Pyelonephritis    Plan: Continued IV Abx, Pain management, f/u cultures/labs, probable dc tomorrow evening

## 2021-07-19 NOTE — DISCHARGE NOTE NURSING/CASE MANAGEMENT/SOCIAL WORK - NSDCVIVACCINE_GEN_ALL_CORE_FT
MMR; 23-Jul-2020 17:36; Yessenia Reed (RN); [employer] [id number] (Exp. Date: 13-Aug-2021); SubCutaneous; Arm Left; 0.5 milliLiter(s); VIS (VIS Published: 20-Apr-2012, VIS Presented: 23-Jul-2020);

## 2021-07-19 NOTE — DISCHARGE NOTE OB - PLAN OF CARE
F/u with PMD in 2 weeks healthy recovery Take keflex 500mg 4 times a day (every 6 hours) for 7 days  F/u with Dr. Canales as scheduled for prenatal appointment

## 2021-07-19 NOTE — DISCHARGE NOTE NURSING/CASE MANAGEMENT/SOCIAL WORK - PATIENT PORTAL LINK FT
You can access the FollowMyHealth Patient Portal offered by [facility] by registering at the following website: [email or web address]. By joining [employer]’s FollowMyHealth portal, you will also be able to view your health information using other applications (apps) compatible with our system.

## 2021-07-19 NOTE — DISCHARGE NOTE OB - PATIENT PORTAL LINK FT
You can access the FollowMyHealth Patient Portal offered by [facility] by registering at the following website: [email or web address]. By joining [employer]’s FollowMyHealth portal, you will also be able to view your health information using other applications (apps) compatible with our system.

## 2021-07-19 NOTE — PROGRESS NOTE ADULT - SUBJECTIVE AND OBJECTIVE BOX
Chief Complaint: flank pain    HPI: Pt seen and evaluated at bedside, doing well this morning. Reports right sided flank is resolved at this time. Reports good fetal movement. Denies fever, chills, SOB, chest pain, abdominal pain, LOF, vaginal bleeding, dysuria or hematuria.     ROS: Denies cardiovascular or respiratory symptoms    PAST MEDICAL & SURGICAL HISTORY:  No pertinent past medical history    No significant past surgical history    Physical Exam  Vital Signs Last 24 Hrs  T(F): 97.6 (19 Jul 2021 05:46), Max: 98.4 (18 Jul 2021 19:28)  HR: 83 (19 Jul 2021 05:46) (83 - 100)  BP: 110/57 (19 Jul 2021 05:46) (94/46 - 115/55)  RR: 18 (19 Jul 2021 05:46) (18 - 19)    Physical exam:  General - AAOx3, NAD  Heart - S1S2, RRR  Lungs - CTA BL  Abdomen:  - gravid, BS+, Soft, nontender, nondistended, no CVA tenderness  Extremities - No calf tenderness, no swelling, SCDs on     MEDICATIONS  (STANDING):  cefTRIAXone   IVPB 1000 milliGRAM(s) IV Intermittent every 24 hours  lactated ringers. 1000 milliLiter(s) (125 mL/Hr) IV Continuous <Continuous>  prenatal multivitamin 1 Tablet(s) Oral daily    MEDICATIONS  (PRN):  acetaminophen   Tablet .. 650 milliGRAM(s) Oral every 6 hours PRN Temp greater or equal to 38C (100.4F), Mild Pain (1 - 3)  ondansetron Injectable 4 milliGRAM(s) IV Push every 6 hours PRN Nausea and/or Vomiting  oxyCODONE    IR 5 milliGRAM(s) Oral every 6 hours PRN Severe Pain (7 - 10)      Labs:                        11.8   7.35  )-----------( 182      ( 18 Jul 2021 05:53 )             35.5                         12.4   8.47  )-----------( 193      ( 17 Jul 2021 06:20 )             37.4     < from: US Kidney and Bladder (07.16.21 @ 19:57) >  EXAM:  US KIDNEYS AND BLADDER            PROCEDURE DATE:  07/16/2021      INTERPRETATION:  CLINICAL INFORMATION: Hematuria    COMPARISON: None available.    TECHNIQUE: Sonography of the kidneys and bladder.    FINDINGS:  Right kidney: 13.0 cm. No evidence of calculus or hydronephrosis.  Left kidney:  12.2 cm. No evidence of calculus or hydronephrosis.  Urinary bladder: Underdistended bladder with prevoid volume of approximately 115 cc.  Postvoid volume is approximately 10 cc. Right ureteral jet seen. Left ureteral jet not seen, a nonspecific finding.    IMPRESSION:  No hydronephrosis.    < end of copied text >

## 2021-07-21 ENCOUNTER — ASOB RESULT (OUTPATIENT)
Age: 31
End: 2021-07-21

## 2021-07-21 ENCOUNTER — APPOINTMENT (OUTPATIENT)
Dept: ANTEPARTUM | Facility: CLINIC | Age: 31
End: 2021-07-21
Payer: COMMERCIAL

## 2021-07-21 VITALS
DIASTOLIC BLOOD PRESSURE: 78 MMHG | WEIGHT: 221 LBS | BODY MASS INDEX: 35.52 KG/M2 | SYSTOLIC BLOOD PRESSURE: 116 MMHG | TEMPERATURE: 98 F | HEIGHT: 66 IN

## 2021-07-21 DIAGNOSIS — O35.9XX0 MATERNAL CARE FOR (SUSPECTED) FETAL ABNORMALITY AND DAMAGE, UNSPECIFIED, NOT APPLICABLE OR UNSPECIFIED: ICD-10-CM

## 2021-07-21 DIAGNOSIS — Z36.86 ENCOUNTER FOR ANTENATAL SCREENING FOR CERVICAL LENGTH: ICD-10-CM

## 2021-07-21 PROCEDURE — 99072 ADDL SUPL MATRL&STAF TM PHE: CPT

## 2021-07-21 PROCEDURE — 76817 TRANSVAGINAL US OBSTETRIC: CPT

## 2021-07-21 PROCEDURE — 76811 OB US DETAILED SNGL FETUS: CPT

## 2021-07-26 DIAGNOSIS — O23.02 INFECTIONS OF KIDNEY IN PREGNANCY, SECOND TRIMESTER: ICD-10-CM

## 2021-07-26 DIAGNOSIS — Z3A.20 20 WEEKS GESTATION OF PREGNANCY: ICD-10-CM

## 2021-09-29 ENCOUNTER — APPOINTMENT (OUTPATIENT)
Dept: ANTEPARTUM | Facility: CLINIC | Age: 31
End: 2021-09-29
Payer: COMMERCIAL

## 2021-09-29 ENCOUNTER — ASOB RESULT (OUTPATIENT)
Age: 31
End: 2021-09-29

## 2021-09-29 VITALS
WEIGHT: 229 LBS | HEIGHT: 66 IN | BODY MASS INDEX: 36.8 KG/M2 | SYSTOLIC BLOOD PRESSURE: 122 MMHG | DIASTOLIC BLOOD PRESSURE: 81 MMHG

## 2021-09-29 PROCEDURE — 99212 OFFICE O/P EST SF 10 MIN: CPT | Mod: 25

## 2021-09-29 PROCEDURE — 76816 OB US FOLLOW-UP PER FETUS: CPT

## 2021-11-03 ENCOUNTER — ASOB RESULT (OUTPATIENT)
Age: 31
End: 2021-11-03

## 2021-11-03 ENCOUNTER — APPOINTMENT (OUTPATIENT)
Dept: ANTEPARTUM | Facility: CLINIC | Age: 31
End: 2021-11-03
Payer: COMMERCIAL

## 2021-11-03 ENCOUNTER — OUTPATIENT (OUTPATIENT)
Dept: OUTPATIENT SERVICES | Facility: HOSPITAL | Age: 31
LOS: 1 days | Discharge: HOME | End: 2021-11-03

## 2021-11-03 VITALS
HEART RATE: 105 BPM | DIASTOLIC BLOOD PRESSURE: 78 MMHG | RESPIRATION RATE: 20 BRPM | SYSTOLIC BLOOD PRESSURE: 135 MMHG | TEMPERATURE: 97 F

## 2021-11-03 VITALS
HEIGHT: 66 IN | WEIGHT: 223 LBS | HEART RATE: 93 BPM | BODY MASS INDEX: 35.84 KG/M2 | SYSTOLIC BLOOD PRESSURE: 125 MMHG | DIASTOLIC BLOOD PRESSURE: 75 MMHG

## 2021-11-03 VITALS — DIASTOLIC BLOOD PRESSURE: 78 MMHG | HEART RATE: 105 BPM | SYSTOLIC BLOOD PRESSURE: 135 MMHG

## 2021-11-03 DIAGNOSIS — O26.893 OTHER SPECIFIED PREGNANCY RELATED CONDITIONS, THIRD TRIMESTER: ICD-10-CM

## 2021-11-03 PROCEDURE — 76816 OB US FOLLOW-UP PER FETUS: CPT | Mod: 26

## 2021-11-03 PROCEDURE — 76818 FETAL BIOPHYS PROFILE W/NST: CPT | Mod: 26

## 2021-11-03 NOTE — OB PROVIDER TRIAGE NOTE - ADDITIONAL INSTRUCTIONS
-Follow up with PMD at next scheduled visit next week  -PO maternal hydration  -FKC  - labor precautions

## 2021-11-03 NOTE — OB PROVIDER TRIAGE NOTE - NSOBPROVIDERNOTE_OBGYN_ALL_OB_FT
32yo  at 35w5d, GBS unknown, h/o pyelonephritis this pregnancy now resolved, complicated by macrosomia, with recent fetal tachycardia now resolved, otherwise doing well.   -Monitor vitals  -Cont efm/toco  -BPP  in office  -Reevaluate in 40 min for reactive NST  -Anticipate dc home if NST reactive    Dr Thomas and Dr Canales aware. 30yo  at 35w5d, GBS unknown, h/o pyelonephritis this pregnancy now resolved, complicated by macrosomia, with recent fetal tachycardia now resolved, otherwise doing well.   -Monitor vitals  -Cont efm/toco  -BPP  in office  -Reevaluate in 40 min for reactive NST  -Anticipate dc home if NST reactive    Dr Thomas and Dr Canales aware.    ADDENDUM: Patient reevaluated following monitoring, NST reactive. To discharge with reassuring maternal and fetal status and f/u with PMD at next scheduled visit.

## 2021-11-03 NOTE — OB PROVIDER TRIAGE NOTE - NS_OBGYNHISTORY_OBGYN_ALL_OB_FT
Ob Hx:   1 FT  at 39w, no complicatiosn, 8-14lb    Gyn Hx: denies uterine fibroids, ovarian cysts, abnormal paps, STIs

## 2021-11-03 NOTE — OB PROVIDER TRIAGE NOTE - NSHPLABSRESULTS_GEN_ALL_CORE
Sonos:   @12w0d: +FHR, NB present, NT 74%, adnexa WNL  @20w5d: +FHR, breech, posterior placenta, MVP 4.2cm, EFW 413g, anatomy WNL, CL WNL  @30w3d: +FHR, vtx, posterior placenta, MVP 3.92cm, EFW 2124g (98%), AC 91%   @35w5d: +FHR, vtx, posterior placenta, MVP 6.26cm, BPP 8/10, EFW 3713g (>99%)

## 2021-11-03 NOTE — OB PROVIDER TRIAGE NOTE - HISTORY OF PRESENT ILLNESS
32yo  at 35w5d, JULIO CÉSAR 12/3/21 by 1st trimester sono, presents to L&D after fetal tachycardia was noted on ultrasound today. Was seen for growth sono, fetal tachycardia was noted on ultrasound, and NST for 40 min showed FHR in 180s. BPP at that time was 8/8. Denies CTX, LOF, VB, and reports good FM. History of pyelonephritis this pregnancy at 20w GA, was hospitalized and treated. No urinary symptoms at this time. Afebrile. Denies HA, CP, SOB, N/V, palpitations, LE edema. GBS unknown, was swabbed in the office yesterday. Did not require GBS ppx in last pregnancy. Was seen by PMD in office yesterday, on SVE was soft and 0cm dilated.   Pregnancy complicated by macrosomia, on US today, EFW >99%ile and AC >99%ile.

## 2021-11-03 NOTE — OB PROVIDER TRIAGE NOTE - NSHPPHYSICALEXAM_GEN_ALL_CORE
Physical exam:    Vital Signs Last 24 Hrs  T(F): 97.1 (03 Nov 2021 18:08), Max: 97.1 (03 Nov 2021 18:08)  HR: 105 (03 Nov 2021 18:08) (105 - 105)  BP: 135/78 (03 Nov 2021 18:08) (135/78 - 135/78)  RR: 20 (03 Nov 2021 18:08) (20 - 20)    Gen: AAOx3, NAD  Abdomen: Soft, nontender, no distension, gravid    EFM: 135/mod sang/pos accel  toco: irregular  SVE: deferred

## 2021-11-03 NOTE — OB PROVIDER TRIAGE NOTE - NSICDXFAMILYHX_GEN_ALL_CORE_FT
FAMILY HISTORY:  Father  Still living? Unknown  Family history of diabetes mellitus, Age at diagnosis: Age Unknown  Family history of hypertension, Age at diagnosis: Age Unknown  FH: type 2 diabetes, Age at diagnosis: Age Unknown

## 2021-11-18 ENCOUNTER — ASOB RESULT (OUTPATIENT)
Age: 31
End: 2021-11-18

## 2021-11-18 ENCOUNTER — APPOINTMENT (OUTPATIENT)
Dept: ANTEPARTUM | Facility: CLINIC | Age: 31
End: 2021-11-18
Payer: COMMERCIAL

## 2021-11-18 VITALS
SYSTOLIC BLOOD PRESSURE: 120 MMHG | DIASTOLIC BLOOD PRESSURE: 80 MMHG | HEIGHT: 66 IN | WEIGHT: 239 LBS | BODY MASS INDEX: 38.41 KG/M2

## 2021-11-18 DIAGNOSIS — O99.210 OBESITY COMPLICATING PREGNANCY, UNSPECIFIED TRIMESTER: ICD-10-CM

## 2021-11-18 DIAGNOSIS — O36.63X0 MATERNAL CARE FOR EXCESSIVE FETAL GROWTH, THIRD TRIMESTER, NOT APPLICABLE OR UNSPECIFIED: ICD-10-CM

## 2021-11-18 PROCEDURE — 76819 FETAL BIOPHYS PROFIL W/O NST: CPT | Mod: 26

## 2021-11-26 ENCOUNTER — INPATIENT (INPATIENT)
Facility: HOSPITAL | Age: 31
LOS: 0 days | Discharge: HOME | End: 2021-11-27
Attending: OBSTETRICS & GYNECOLOGY | Admitting: OBSTETRICS & GYNECOLOGY

## 2021-11-26 VITALS — HEART RATE: 103 BPM | SYSTOLIC BLOOD PRESSURE: 135 MMHG

## 2021-11-26 LAB
APPEARANCE UR: ABNORMAL
BACTERIA # UR AUTO: ABNORMAL
BASOPHILS # BLD AUTO: 0.03 K/UL — SIGNIFICANT CHANGE UP (ref 0–0.2)
BASOPHILS NFR BLD AUTO: 0.3 % — SIGNIFICANT CHANGE UP (ref 0–1)
BILIRUB UR-MCNC: NEGATIVE — SIGNIFICANT CHANGE UP
BLD GP AB SCN SERPL QL: SIGNIFICANT CHANGE UP
COLOR SPEC: YELLOW — SIGNIFICANT CHANGE UP
COVID-19 SPIKE DOMAIN AB INTERP: NEGATIVE — SIGNIFICANT CHANGE UP
COVID-19 SPIKE DOMAIN ANTIBODY RESULT: 0.4 U/ML — SIGNIFICANT CHANGE UP
DIFF PNL FLD: NEGATIVE — SIGNIFICANT CHANGE UP
EOSINOPHIL # BLD AUTO: 0.06 K/UL — SIGNIFICANT CHANGE UP (ref 0–0.7)
EOSINOPHIL NFR BLD AUTO: 0.6 % — SIGNIFICANT CHANGE UP (ref 0–8)
EPI CELLS # UR: 4 /HPF — SIGNIFICANT CHANGE UP (ref 0–5)
GLUCOSE UR QL: NEGATIVE — SIGNIFICANT CHANGE UP
HCT VFR BLD CALC: 37.4 % — SIGNIFICANT CHANGE UP (ref 37–47)
HGB BLD-MCNC: 12.1 G/DL — SIGNIFICANT CHANGE UP (ref 12–16)
HYALINE CASTS # UR AUTO: 0 /LPF — SIGNIFICANT CHANGE UP (ref 0–7)
IMM GRANULOCYTES NFR BLD AUTO: 1.4 % — HIGH (ref 0.1–0.3)
KETONES UR-MCNC: ABNORMAL
LEUKOCYTE ESTERASE UR-ACNC: NEGATIVE — SIGNIFICANT CHANGE UP
LYMPHOCYTES # BLD AUTO: 1.22 K/UL — SIGNIFICANT CHANGE UP (ref 1.2–3.4)
LYMPHOCYTES # BLD AUTO: 12.8 % — LOW (ref 20.5–51.1)
MCHC RBC-ENTMCNC: 27.1 PG — SIGNIFICANT CHANGE UP (ref 27–31)
MCHC RBC-ENTMCNC: 32.4 G/DL — SIGNIFICANT CHANGE UP (ref 32–37)
MCV RBC AUTO: 83.7 FL — SIGNIFICANT CHANGE UP (ref 81–99)
MONOCYTES # BLD AUTO: 0.7 K/UL — HIGH (ref 0.1–0.6)
MONOCYTES NFR BLD AUTO: 7.3 % — SIGNIFICANT CHANGE UP (ref 1.7–9.3)
NEUTROPHILS # BLD AUTO: 7.39 K/UL — HIGH (ref 1.4–6.5)
NEUTROPHILS NFR BLD AUTO: 77.6 % — HIGH (ref 42.2–75.2)
NITRITE UR-MCNC: NEGATIVE — SIGNIFICANT CHANGE UP
NRBC # BLD: 0 /100 WBCS — SIGNIFICANT CHANGE UP (ref 0–0)
PH UR: 6.5 — SIGNIFICANT CHANGE UP (ref 5–8)
PLATELET # BLD AUTO: 222 K/UL — SIGNIFICANT CHANGE UP (ref 130–400)
PRENATAL SYPHILIS TEST: SIGNIFICANT CHANGE UP
PROT UR-MCNC: ABNORMAL
RBC # BLD: 4.47 M/UL — SIGNIFICANT CHANGE UP (ref 4.2–5.4)
RBC # FLD: 13.8 % — SIGNIFICANT CHANGE UP (ref 11.5–14.5)
RBC CASTS # UR COMP ASSIST: 0 /HPF — SIGNIFICANT CHANGE UP (ref 0–4)
SARS-COV-2 IGG+IGM SERPL QL IA: 0.4 U/ML — SIGNIFICANT CHANGE UP
SARS-COV-2 IGG+IGM SERPL QL IA: NEGATIVE — SIGNIFICANT CHANGE UP
SARS-COV-2 RNA SPEC QL NAA+PROBE: SIGNIFICANT CHANGE UP
SP GR SPEC: 1.03 — SIGNIFICANT CHANGE UP (ref 1.01–1.03)
UROBILINOGEN FLD QL: SIGNIFICANT CHANGE UP
WBC # BLD: 9.53 K/UL — SIGNIFICANT CHANGE UP (ref 4.8–10.8)
WBC # FLD AUTO: 9.53 K/UL — SIGNIFICANT CHANGE UP (ref 4.8–10.8)
WBC UR QL: 0 /HPF — SIGNIFICANT CHANGE UP (ref 0–5)

## 2021-11-26 RX ORDER — CITRIC ACID/SODIUM CITRATE 300-500 MG
15 SOLUTION, ORAL ORAL EVERY 6 HOURS
Refills: 0 | Status: DISCONTINUED | OUTPATIENT
Start: 2021-11-26 | End: 2021-11-26

## 2021-11-26 RX ORDER — OXYCODONE HYDROCHLORIDE 5 MG/1
5 TABLET ORAL ONCE
Refills: 0 | Status: DISCONTINUED | OUTPATIENT
Start: 2021-11-26 | End: 2021-11-27

## 2021-11-26 RX ORDER — DIPHENHYDRAMINE HCL 50 MG
25 CAPSULE ORAL EVERY 6 HOURS
Refills: 0 | Status: DISCONTINUED | OUTPATIENT
Start: 2021-11-26 | End: 2021-11-27

## 2021-11-26 RX ORDER — ONDANSETRON 8 MG/1
4 TABLET, FILM COATED ORAL ONCE
Refills: 0 | Status: COMPLETED | OUTPATIENT
Start: 2021-11-26 | End: 2021-11-26

## 2021-11-26 RX ORDER — OXYCODONE HYDROCHLORIDE 5 MG/1
5 TABLET ORAL
Refills: 0 | Status: DISCONTINUED | OUTPATIENT
Start: 2021-11-26 | End: 2021-11-27

## 2021-11-26 RX ORDER — AER TRAVELER 0.5 G/1
1 SOLUTION RECTAL; TOPICAL EVERY 4 HOURS
Refills: 0 | Status: DISCONTINUED | OUTPATIENT
Start: 2021-11-26 | End: 2021-11-27

## 2021-11-26 RX ORDER — NALOXONE HYDROCHLORIDE 4 MG/.1ML
0.1 SPRAY NASAL
Refills: 0 | Status: DISCONTINUED | OUTPATIENT
Start: 2021-11-26 | End: 2021-11-27

## 2021-11-26 RX ORDER — DIBUCAINE 1 %
1 OINTMENT (GRAM) RECTAL EVERY 6 HOURS
Refills: 0 | Status: DISCONTINUED | OUTPATIENT
Start: 2021-11-26 | End: 2021-11-27

## 2021-11-26 RX ORDER — HYDROCORTISONE 1 %
1 OINTMENT (GRAM) TOPICAL EVERY 6 HOURS
Refills: 0 | Status: DISCONTINUED | OUTPATIENT
Start: 2021-11-26 | End: 2021-11-27

## 2021-11-26 RX ORDER — BENZOCAINE 10 %
1 GEL (GRAM) MUCOUS MEMBRANE EVERY 6 HOURS
Refills: 0 | Status: DISCONTINUED | OUTPATIENT
Start: 2021-11-26 | End: 2021-11-27

## 2021-11-26 RX ORDER — IBUPROFEN 200 MG
600 TABLET ORAL EVERY 6 HOURS
Refills: 0 | Status: DISCONTINUED | OUTPATIENT
Start: 2021-11-26 | End: 2021-11-27

## 2021-11-26 RX ORDER — IBUPROFEN 200 MG
600 TABLET ORAL EVERY 6 HOURS
Refills: 0 | Status: COMPLETED | OUTPATIENT
Start: 2021-11-26 | End: 2022-10-25

## 2021-11-26 RX ORDER — PRAMOXINE HYDROCHLORIDE 150 MG/15G
1 AEROSOL, FOAM RECTAL EVERY 4 HOURS
Refills: 0 | Status: DISCONTINUED | OUTPATIENT
Start: 2021-11-26 | End: 2021-11-27

## 2021-11-26 RX ORDER — SODIUM CHLORIDE 9 MG/ML
3 INJECTION INTRAMUSCULAR; INTRAVENOUS; SUBCUTANEOUS EVERY 8 HOURS
Refills: 0 | Status: DISCONTINUED | OUTPATIENT
Start: 2021-11-26 | End: 2021-11-27

## 2021-11-26 RX ORDER — DEXAMETHASONE 0.5 MG/5ML
4 ELIXIR ORAL EVERY 6 HOURS
Refills: 0 | Status: DISCONTINUED | OUTPATIENT
Start: 2021-11-26 | End: 2021-11-27

## 2021-11-26 RX ORDER — ONDANSETRON 8 MG/1
4 TABLET, FILM COATED ORAL EVERY 6 HOURS
Refills: 0 | Status: DISCONTINUED | OUTPATIENT
Start: 2021-11-26 | End: 2021-11-27

## 2021-11-26 RX ORDER — SODIUM CHLORIDE 9 MG/ML
1000 INJECTION, SOLUTION INTRAVENOUS
Refills: 0 | Status: DISCONTINUED | OUTPATIENT
Start: 2021-11-26 | End: 2021-11-26

## 2021-11-26 RX ORDER — OXYTOCIN 10 UNIT/ML
333.33 VIAL (ML) INJECTION
Qty: 20 | Refills: 0 | Status: DISCONTINUED | OUTPATIENT
Start: 2021-11-26 | End: 2021-11-27

## 2021-11-26 RX ORDER — MAGNESIUM HYDROXIDE 400 MG/1
30 TABLET, CHEWABLE ORAL
Refills: 0 | Status: DISCONTINUED | OUTPATIENT
Start: 2021-11-26 | End: 2021-11-27

## 2021-11-26 RX ORDER — TETANUS TOXOID, REDUCED DIPHTHERIA TOXOID AND ACELLULAR PERTUSSIS VACCINE, ADSORBED 5; 2.5; 8; 8; 2.5 [IU]/.5ML; [IU]/.5ML; UG/.5ML; UG/.5ML; UG/.5ML
0.5 SUSPENSION INTRAMUSCULAR ONCE
Refills: 0 | Status: DISCONTINUED | OUTPATIENT
Start: 2021-11-26 | End: 2021-11-27

## 2021-11-26 RX ORDER — INFLUENZA VIRUS VACCINE 15; 15; 15; 15 UG/.5ML; UG/.5ML; UG/.5ML; UG/.5ML
0.5 SUSPENSION INTRAMUSCULAR ONCE
Refills: 0 | Status: COMPLETED | OUTPATIENT
Start: 2021-11-26 | End: 2021-11-27

## 2021-11-26 RX ORDER — SIMETHICONE 80 MG/1
80 TABLET, CHEWABLE ORAL EVERY 4 HOURS
Refills: 0 | Status: DISCONTINUED | OUTPATIENT
Start: 2021-11-26 | End: 2021-11-27

## 2021-11-26 RX ORDER — FENTANYL/BUPIVACAINE/NS/PF 2MCG/ML-.1
250 PLASTIC BAG, INJECTION (ML) INJECTION
Refills: 0 | Status: DISCONTINUED | OUTPATIENT
Start: 2021-11-26 | End: 2021-11-27

## 2021-11-26 RX ORDER — ACETAMINOPHEN 500 MG
975 TABLET ORAL
Refills: 0 | Status: DISCONTINUED | OUTPATIENT
Start: 2021-11-26 | End: 2021-11-27

## 2021-11-26 RX ORDER — KETOROLAC TROMETHAMINE 30 MG/ML
30 SYRINGE (ML) INJECTION ONCE
Refills: 0 | Status: DISCONTINUED | OUTPATIENT
Start: 2021-11-26 | End: 2021-11-27

## 2021-11-26 RX ORDER — LANOLIN
1 OINTMENT (GRAM) TOPICAL EVERY 6 HOURS
Refills: 0 | Status: DISCONTINUED | OUTPATIENT
Start: 2021-11-26 | End: 2021-11-27

## 2021-11-26 RX ADMIN — ONDANSETRON 4 MILLIGRAM(S): 8 TABLET, FILM COATED ORAL at 05:45

## 2021-11-26 RX ADMIN — Medication 975 MILLIGRAM(S): at 22:00

## 2021-11-26 RX ADMIN — SODIUM CHLORIDE 125 MILLILITER(S): 9 INJECTION, SOLUTION INTRAVENOUS at 05:50

## 2021-11-26 RX ADMIN — SODIUM CHLORIDE 3 MILLILITER(S): 9 INJECTION INTRAMUSCULAR; INTRAVENOUS; SUBCUTANEOUS at 21:36

## 2021-11-26 RX ADMIN — Medication 975 MILLIGRAM(S): at 21:31

## 2021-11-26 RX ADMIN — Medication 1 TABLET(S): at 18:46

## 2021-11-26 NOTE — OB PROVIDER DELIVERY SUMMARY - NSSELHIDDEN_OBGYN_ALL_OB_FT
[NS_DeliveryRN_OBGYN_ALL_OB_FT:[id number]],[NS_DeliveryAttending2_OBGYN_ALL_OB_FT:[id number]],[NS_DeliveryAttending1_OBGYN_ALL_OB_FT:[id number]]

## 2021-11-26 NOTE — OB RN PATIENT PROFILE - PAIN SCALE PREFERRED, PROFILE
22 day old FT, female birth weigh 2.9kg now 3.3 presents to ed with mom for facial skin rash, gassy, appears to be in abd pain, spits up milk - no projectile vomit.  pt is breast and bottle feeding Q3hrs about 2 Oz.  pooping normally but urine decrease past 2 days from 5 to about 3.  no fever.  1st baby.  has appt with MD today    pt a normal growing infant.  continue to monitor wet diapers and f/u with peds md. currently no sign of dehydration or intussusception/intra-abd path numerical 0-10

## 2021-11-26 NOTE — OB PROVIDER DELIVERY SUMMARY - NSPROVIDERDELIVERYNOTE_OBGYN_ALL_OB_FT
Pt became fully dilated and pushed well over intact perineum, delivery of head in ELMO position, nose and mouth bulb suctioned on perineum, followed by delivery of shoulders and body without difficulty, live female infant, APGARs 9/9, 3-vessel cord +placenta complete, no complications

## 2021-11-26 NOTE — PROCEDURE NOTE - ADDITIONAL PROCEDURE DETAILS
11/26/21 1110: epidural top off: aspiration negative, 10mL of 0.125% Bupivacaine administered, vital signs stable and patient tolerated procedure well. patient verbalized pain relief.

## 2021-11-26 NOTE — OB RN PATIENT PROFILE - CAREGIVER
Give 1/2 teaspoonful Periactin twice daily on weekdays only   Continue Pediasure with fiber 3.5 to 4 cartons daily  Continue to offer table foods 3 times a day and high calorie snacks as per handout  Return in one month
Declines

## 2021-11-26 NOTE — OB PROVIDER H&P - NSHPLABSRESULTS_GEN_ALL_CORE
1hr   3hr GTT 70/175/169/126    Hgb A1C: 4.6% 9/23/21    Genetics:  msAFP neg  NIPT low risk    Sonos:   @12w0d: +FHR, NB present, NT 74%, adnexa WNL  @20w5d: +FHR, breech, posterior placenta, MVP 4.2cm, EFW 413g, anatomy WNL, CL WNL  @30w3d: +FHR, vtx, posterior placenta, MVP 3.92cm, EFW 2124g (98%), AC 91%   @35w5d: +FHR, vtx, posterior placenta, MVP 6.26cm, BPP 8/10 for equivocal NST, EFW 3713g (>99%), AC >99%  @37w6d: +FHR, vertex, posterior placeta, MVP 4.58cm, BPP 8/8

## 2021-11-26 NOTE — OB PROVIDER H&P - HISTORY OF PRESENT ILLNESS
32yo  at 39w0d, JULIO CÉSAR 12/3/21 by 1st trimester sono, presents to L&D with contractions. They began at 2AM, every 3min, pain 8/10 intensity. Denies LOF, VB. Reports good FM. Patient had a history of pyelonephritis at 20w GA, was hospitalized and treated. Elevated 1 hr GCT, negative GTT. Last SVE by PMD on Wednesday, was 3cm dilated. Pregnancy complicated by macrosomia, on US at 35w, EFW >99%ile and AC >99%ile. GBS is negative.

## 2021-11-26 NOTE — OB PROVIDER H&P - ASSESSMENT
30yo  at 39w0d, GBS neg, h/o pyelonephritis at 20w GA and treated, suspected fetal macrosomia EFW and AC >99% on 11/3, in early labor.   -Admit to L&D  -Admission labs  -Monitor vitals  -Cont efm/toco  -Clear liquid diet  -Pain management prn    Dr Rendon and Dr Canales aware. 30yo  at 39w0d, GBS neg, h/o pyelonephritis at 20w GA and treated, suspected fetal macrosomia EFW and AC >99% on 11/3, in early labor.   -Admit to L&D  -Admission labs  -Monitor vitals  -Cont efm/toco  -Clear liquid diet  -Pain management prn and desires epidural    Dr Rendon and Dr Canales aware.

## 2021-11-26 NOTE — OB PROVIDER H&P - NSHPPHYSICALEXAM_GEN_ALL_CORE
Physical exam:    Vital Signs Last 24 Hrs  T(F): 98.1 (26 Nov 2021 05:08), Max: 98.1 (26 Nov 2021 05:04)  HR: 100 (26 Nov 2021 05:09) (100 - 103)  BP: 135/75 (26 Nov 2021 05:09) (135/72 - 135/-)  RR: 18 (26 Nov 2021 05:08) (18 - 18)    Gen: AAOx3, NAD  Abdomen: Soft, nontender, no distension, gravid, strong palpable contractions    EFM: 150/mod sang/pos accel  toco:  q2-3 min  SVE: 4/90/-1, vtx, intact  EFW by leopold: 4100

## 2021-11-26 NOTE — OB RN DELIVERY SUMMARY - NSSELHIDDEN_OBGYN_ALL_OB_FT
[NS_DeliveryRN_OBGYN_ALL_OB_FT:[id number]] [NS_DeliveryRN_OBGYN_ALL_OB_FT:[id number]],[NS_DeliveryAttending2_OBGYN_ALL_OB_FT:[id number]] [NS_DeliveryRN_OBGYN_ALL_OB_FT:[id number]],[NS_DeliveryAttending2_OBGYN_ALL_OB_FT:[id number]],[NS_DeliveryAttending1_OBGYN_ALL_OB_FT:[id number]]

## 2021-11-27 ENCOUNTER — TRANSCRIPTION ENCOUNTER (OUTPATIENT)
Age: 31
End: 2021-11-27

## 2021-11-27 VITALS
TEMPERATURE: 96 F | DIASTOLIC BLOOD PRESSURE: 76 MMHG | HEART RATE: 91 BPM | RESPIRATION RATE: 19 BRPM | SYSTOLIC BLOOD PRESSURE: 122 MMHG

## 2021-11-27 LAB
BASOPHILS # BLD AUTO: 0.04 K/UL — SIGNIFICANT CHANGE UP (ref 0–0.2)
BASOPHILS NFR BLD AUTO: 0.4 % — SIGNIFICANT CHANGE UP (ref 0–1)
EOSINOPHIL # BLD AUTO: 0.09 K/UL — SIGNIFICANT CHANGE UP (ref 0–0.7)
EOSINOPHIL NFR BLD AUTO: 0.9 % — SIGNIFICANT CHANGE UP (ref 0–8)
HCT VFR BLD CALC: 30.3 % — LOW (ref 37–47)
HGB BLD-MCNC: 10 G/DL — LOW (ref 12–16)
IMM GRANULOCYTES NFR BLD AUTO: 1.5 % — HIGH (ref 0.1–0.3)
LYMPHOCYTES # BLD AUTO: 1.73 K/UL — SIGNIFICANT CHANGE UP (ref 1.2–3.4)
LYMPHOCYTES # BLD AUTO: 18 % — LOW (ref 20.5–51.1)
MCHC RBC-ENTMCNC: 27.9 PG — SIGNIFICANT CHANGE UP (ref 27–31)
MCHC RBC-ENTMCNC: 33 G/DL — SIGNIFICANT CHANGE UP (ref 32–37)
MCV RBC AUTO: 84.4 FL — SIGNIFICANT CHANGE UP (ref 81–99)
MONOCYTES # BLD AUTO: 1.05 K/UL — HIGH (ref 0.1–0.6)
MONOCYTES NFR BLD AUTO: 10.9 % — HIGH (ref 1.7–9.3)
NEUTROPHILS # BLD AUTO: 6.56 K/UL — HIGH (ref 1.4–6.5)
NEUTROPHILS NFR BLD AUTO: 68.3 % — SIGNIFICANT CHANGE UP (ref 42.2–75.2)
NRBC # BLD: 0 /100 WBCS — SIGNIFICANT CHANGE UP (ref 0–0)
PLATELET # BLD AUTO: 196 K/UL — SIGNIFICANT CHANGE UP (ref 130–400)
RBC # BLD: 3.59 M/UL — LOW (ref 4.2–5.4)
RBC # FLD: 13.9 % — SIGNIFICANT CHANGE UP (ref 11.5–14.5)
WBC # BLD: 9.61 K/UL — SIGNIFICANT CHANGE UP (ref 4.8–10.8)
WBC # FLD AUTO: 9.61 K/UL — SIGNIFICANT CHANGE UP (ref 4.8–10.8)

## 2021-11-27 RX ORDER — IBUPROFEN 200 MG
1 TABLET ORAL
Qty: 0 | Refills: 0 | DISCHARGE
Start: 2021-11-27

## 2021-11-27 RX ORDER — DIBUCAINE 1 %
1 OINTMENT (GRAM) RECTAL
Qty: 0 | Refills: 0 | DISCHARGE
Start: 2021-11-27

## 2021-11-27 RX ADMIN — Medication 975 MILLIGRAM(S): at 08:40

## 2021-11-27 RX ADMIN — INFLUENZA VIRUS VACCINE 0.5 MILLILITER(S): 15; 15; 15; 15 SUSPENSION INTRAMUSCULAR at 16:50

## 2021-11-27 RX ADMIN — Medication 600 MILLIGRAM(S): at 12:00

## 2021-11-27 RX ADMIN — Medication 600 MILLIGRAM(S): at 18:37

## 2021-11-27 RX ADMIN — SODIUM CHLORIDE 3 MILLILITER(S): 9 INJECTION INTRAMUSCULAR; INTRAVENOUS; SUBCUTANEOUS at 18:36

## 2021-11-27 RX ADMIN — Medication 975 MILLIGRAM(S): at 09:00

## 2021-11-27 RX ADMIN — Medication 600 MILLIGRAM(S): at 00:41

## 2021-11-27 RX ADMIN — Medication 975 MILLIGRAM(S): at 03:33

## 2021-11-27 RX ADMIN — Medication 975 MILLIGRAM(S): at 15:00

## 2021-11-27 RX ADMIN — Medication 975 MILLIGRAM(S): at 04:00

## 2021-11-27 RX ADMIN — Medication 600 MILLIGRAM(S): at 11:12

## 2021-11-27 RX ADMIN — Medication 600 MILLIGRAM(S): at 01:15

## 2021-11-27 RX ADMIN — Medication 975 MILLIGRAM(S): at 14:23

## 2021-11-27 RX ADMIN — SODIUM CHLORIDE 3 MILLILITER(S): 9 INJECTION INTRAMUSCULAR; INTRAVENOUS; SUBCUTANEOUS at 06:12

## 2021-11-27 RX ADMIN — Medication 600 MILLIGRAM(S): at 17:04

## 2021-11-27 RX ADMIN — Medication 600 MILLIGRAM(S): at 06:56

## 2021-11-27 RX ADMIN — Medication 1 TABLET(S): at 11:32

## 2021-11-27 NOTE — DISCHARGE NOTE OB - ADDITIONAL INSTRUCTIONS
nothing in the vagina for 6 weeks, no tampons, no douching, no baths, no sex. Showers are fine.   Go to the emergency room if you have a temperature greater than 100.4, worsening abdominal pain or increased vaginal bleeding    No Heavy lifting. Light activity and walking okay.

## 2021-11-27 NOTE — DISCHARGE NOTE OB - PATIENT PORTAL LINK FT
You can access the FollowMyHealth Patient Portal offered by [facility] by registering at the following website: [email or web address]. By joining [employer]’s FollowMyHealth portal, you will also be able to view your health information using other applications (apps) compatible with our system.

## 2021-11-27 NOTE — DISCHARGE NOTE OB - CARE PROVIDER_API CALL
<<----- Click to add NO pertinent Family History Bridger Canales)  Obstetrics and Gynecology  [address] [place] [address]  Phone: [phone]  Fax: [phone]  Established Patient  Follow Up Time: 1 month

## 2021-11-27 NOTE — DISCHARGE NOTE OB - CARE PLAN
1 Principal Discharge DX:	Vaginal delivery  Assessment and plan of treatment:	healthy mom and baby

## 2021-11-27 NOTE — PROGRESS NOTE ADULT - SUBJECTIVE AND OBJECTIVE BOX
Pt seen at bedside, no complaints, nursing    Vital Signs Last 24 Hrs  T(C): 36.1 (27 Nov 2021 08:19), Max: 37.5 (26 Nov 2021 11:14)  T(F): 96.9 (27 Nov 2021 08:19), Max: 99.5 (26 Nov 2021 11:14)  HR: 88 (27 Nov 2021 08:19) (88 - 113)  BP: 109/56 (27 Nov 2021 08:19) (109/56 - 152/72)  RR: 19 (27 Nov 2021 08:19) (18 - 19)  SpO2: 90% (26 Nov 2021 13:09) (78% - 100%)    Resp - CTA b/l  CVS - S1S2+, RRR  Breasts - soft, NT  Abd - soft, NTND, BS+, uterus - firm  VE - Minimal lochia, perineum- intact  Ext - No Homans                          10.0   9.61  )-----------( 196      ( 27 Nov 2021 04:30 )             30.3                         12.1   9.53  )-----------( 222      ( 26 Nov 2021 05:45 )             37.4     O POS, Rubella - Immune, Rubeola - Immune, RPR - NR    
PGY3 Note    Patient seen at bedside for evaluation of labor progression, doing well, no complaints. Epidural in place, pain well-controlled    Vital Signs Last 24 Hrs  T(C): 36.7 (2021 05:08), Max: 36.7 (2021 05:04)  T(F): 98.1 (2021 05:08), Max: 98.1 (2021 05:04)  HR: 104 (2021 11:15) (95 - 115)  BP: 129/- (2021 11:15) (91/52 - 144/76)  RR: 18 (2021 05:08) (18 - 18)  SpO2: 97% (2021 11:01) (96% - 99%)    EFM: 150/mod sang/+accels  TOCO: q3mins  SVE: 9/80/-2, bulging membranes    Medications:  Epidural started@0630  ondansetron Injectable: 4 milliGRAM(s) (21 @ 05:45)      Labs:                        12.1   9.53  )-----------( 222      ( 2021 05:45 )             37.4           ABO RH Interpretation: O POS (21 @ 05:45)    Antibody Screen: NEG (21 @ 05:45)    Urinalysis Basic - ( 2021 07:07 )    Color: Yellow / Appearance: Slightly Turbid / S.030 / pH: x  Gluc: x / Ketone: Large  / Bili: Negative / Urobili: <2 mg/dL   Blood: x / Protein: 100 mg/dL / Nitrite: Negative   Leuk Esterase: Negative / RBC: 0 /HPF / WBC 0 /HPF   Sq Epi: x / Non Sq Epi: 4 /HPF / Bacteria: Occasional        Prenatal Syphilis Test: Nonreact (21 @ 05:45)              A/P:   30yo  at 39w0d, GBS neg, h/o pyelonephritis at 20w GA and treated, suspected fetal macrosomia EFW and AC >99% on 11/3, s/p epidural, now in active labor    -cont efm/toco  -cont to monitor vitals  -cont iv hydration    Dr. Canales aware

## 2021-11-29 ENCOUNTER — APPOINTMENT (OUTPATIENT)
Dept: ANTEPARTUM | Facility: CLINIC | Age: 31
End: 2021-11-29

## 2021-12-01 DIAGNOSIS — O36.63X0 MATERNAL CARE FOR EXCESSIVE FETAL GROWTH, THIRD TRIMESTER, NOT APPLICABLE OR UNSPECIFIED: ICD-10-CM

## 2021-12-01 DIAGNOSIS — Z3A.39 39 WEEKS GESTATION OF PREGNANCY: ICD-10-CM

## 2021-12-01 DIAGNOSIS — O40.3XX0 POLYHYDRAMNIOS, THIRD TRIMESTER, NOT APPLICABLE OR UNSPECIFIED: ICD-10-CM

## 2022-05-09 NOTE — ED ADULT TRIAGE NOTE - MEANS OF ARRIVAL
Physical Exam    Constitutional: Appears anxious and pacing in the room.   Eyes: Conjunctiva pink, Sclera clear, PERRLA, EOMI.  ENT: No sinus tenderness. No nasal discharge. No oropharyngeal erythema, edema, or exudates. Uvula midline. No foreign bodies in upper airway  Cardiovascular: Regular rate, regular rhythm. No noted murmurs rubs or gallops.  Respiratory: unlabored respiratory effort, no stridor, clear to auscultation bilaterally no wheezing, rales or rhonchi  Gastrointestinal: Normal bowel sounds. soft, non distended, non-tender abdomen.   Musculoskeletal: supple neck, no midline tenderness. No joint or bony deformity.   Integumentary: warm, dry, no rash  Neurologic: awake, alert, cranial nerves II-XII grossly intact, extremities’ motor and sensory functions grossly intact
ambulatory

## 2022-09-09 NOTE — OB PROVIDER IHI INDUCTION/AUGMENTATION NOTE - NS_DILATION_OBGYN_ALL_OB_NU
9/9/2022       RE: Ramona Ferrer  [address]  [place] [address]     Dear Colleague,    Thank you for referring your patient, Ramona Ferrer, to the [facility] PLASTIC AND RECONSTRUCTIVE SURGERY CLINIC [place] at [facility]. Please see a copy of my visit note below.    Plastic Surgery Outpatient Visit    ID: Ramona Ferrer is a 67 year old female with PMH L breast cancer s/p let mastectomy with RUTH recon 8/25/22 with DR. Feliciano. Has required oxy refill x1, tramadol x1 and robaxin x1 for pain.     S: Complains of itching. Using flucinar (topical steroid) from [employer]. Taking 2 tramadol a day as well as tylenol and ibuprofen. Using topical volteran for pain, requesting refill. Has held all her meds since surgery. Finished lovenox. Taking baby ASA. Breast drain 20cc daily, abdomen 110cc daily.     O:  /69 (BP Location: Right arm, Patient Position: Sitting, Cuff Size: Adult Regular)   Pulse 86   Temp 97.8  F (36.6  C)   SpO2 96%    General: NAD  Chest: L breast flap warm, soft, viable. Incisions c/d/i. Macular rash around incisions in line with tape. [employer] doppler wire in place.   Abdomen: incision c/d/i with mild rash around incision. Drain serosanginous.     A/P:  -overall healing well, tape removed  -ok to continue topical steroid from [employer] or OTC hydrocortisone. Rx for diclofenac and hydroxyzine sent.  -L breast drain removed. Abdomen drain to remain until meeting criteria.  -cook doppler cut at skin  -discussed pain control- 1000mg tylenol q8 and 800mg ibuprofen q8 with tramadol for breakthrough. Trying to wean tramadol. Will call Monday if she needs more pain meds but hopefully can be off of them by then.   -RTC 2 weeks    Vidhi Austin PA-C  Plastic and Reconstructive Surgery    20 minutes spent on the date of the encounter doing chart review, history and physical, dressing changes, documentation and further activity as  noted above.   4

## 2023-01-04 NOTE — PROGRESS NOTE ADULT - SUBJECTIVE AND OBJECTIVE BOX
01/19/23      Magy E Lakeisha  [id number] [address] [place] [id number]       Purnima,     We have made several attempts to reach you regarding a medication request. Dr. Mcgowan has approved Symbicort at a lower dose, please follow the dosing instructions included with the medication. We sent it to [facility] in [place].     Please do not hesitate to contact us with any questions or concerns at the number listed below.    Sincerely,         KENNA Osborne  [facility] [address]  Phone: [phone]              
PGY 2 Note    Patient seen at bedside for evaluation of labor progression.  Mild nausea relieved with zofran. Comfortable s/p epidural. No other complaints.  S/p AROM clear @2315.    Vital Signs Last 24 Hrs  T(C): 37.0 (2020 22:52), Max: 37.0 (2020 22:52)  T(F): 98.6 (2020 22:52), Max: 98.6 (2020 22:52)  HR: 125 (2020 00:34) (96 - 133)  BP: 111/66 (2020 00:32) (111/66 - 130/75)  RR: 20 (2020 21:43) (18 - 20)  SpO2: 96% (2020 00:34) (95% - 99%)      EFM: 150/mod/+accel, cat I  TOCO: q3m  SVE: /-1, ruptured per Dr. Canales @6276    Labs:                        13.2   15.99 )-----------( 237      ( 2020 17:50 )             39.1           ABO RH Interpretation: O POS (20 @ 20:24)  RPR NR  COVID neg    Urinalysis Basic - ( 2020 20:20 )    Color: Yellow / Appearance: Slightly Turbid / S.031 / pH: x  Gluc: x / Ketone: Large  / Bili: Negative / Urobili: 3 mg/dL   Blood: x / Protein: 30 mg/dL / Nitrite: Negative   Leuk Esterase: Large / RBC: 3 /HPF / WBC 46 /HPF   Sq Epi: x / Non Sq Epi: 6 /HPF / Bacteria: Moderate    Meds: ondansetron Injectable 4 milliGRAM(s) IV Push once
PGY 2 Note    Patient seen at bedside for evaluation of labor progression. Comfortable s/p epidural.  No complaints.    Vital Signs Last 24 Hrs  T(C): 37.0 (2020 22:52), Max: 37.0 (2020 22:52)  T(F): 98.6 (2020 22:52), Max: 98.6 (2020 22:52)  HR: 101 (2020 05:49) (96 - 133)  BP: 111/65 (2020 05:48) (98/61 - 133/73)  RR: 20 (2020 21:43) (18 - 20)  SpO2: 96% (2020 05:49) (90% - 99%)    EFM: 135/mod/+accel, cat I  TOCO: q2-3m  SVE: /-1 @2313 per Dr. Canales     Labs:                        13.2   15.99 )-----------( 237      ( 2020 17:50 )             39.1           ABO RH Interpretation: O POS (20 @ 20:24)  RPR NR  COVID neg  Urinalysis Basic - ( 2020 20:20 )    Color: Yellow / Appearance: Slightly Turbid / S.031 / pH: x  Gluc: x / Ketone: Large  / Bili: Negative / Urobili: 3 mg/dL   Blood: x / Protein: 30 mg/dL / Nitrite: Negative   Leuk Esterase: Large / RBC: 3 /HPF / WBC 46 /HPF   Sq Epi: x / Non Sq Epi: 6 /HPF / Bacteria: Moderate          Meds: lactated ringers. 1000 milliLiter(s) IV Continuous <Continuous>  oxytocin Infusion 333.333 milliUNIT(s)/Min IV Continuous <Continuous>  oxytocin Infusion 2 milliUNIT(s)/Min IV Continuous <Continuous>, started @0403, now at 8mu/min
Patient complaining of pressure    AF vSS    FHR CAT I  TOCO Q 2   VE 9/100/0    A/P 28 y/o P0 in active labor  epidural top off  iv hydration  d/c pitocin  Dr Canales aware
Progress Note    Patient seen and examined at bedside. Epidural in place feeling contractions 7/10  intensity     T(C): 36.9 (20 @ 07:44), Max: 37.0 (20 @ 22:52)  HR: 103 (20 @ 10:19) (94 - 133)  BP: 108/53 (20 @ 10:10) (98/61 - 136/85)  RR: 20 (20 @ 21:43) (18 - 20)  SpO2: 98% (20 @ 10:19) (85% - 100%)  EFM: CAT 1  [place]:  Q  2-3  SVE:  7/-0 vertex    Meds:   (Floorstock)   1 Each &lt;See Task&gt; (20 @ 22:48)    (Floorstock)   1 Each &lt;See Task&gt; (20 @ 00:15)    ondansetron Injectable   4 milliGRAM(s) IV Push (20 @ 00:25)    oxytocin Infusion   2 mL/Hr IV Continuous (20 @ 03:32)        Labs:                        13.2   15.99 )-----------( 237      ( 2020 17:50 )             39.1         Urinalysis Basic - ( 2020 20:20 )    Color: Yellow / Appearance: Slightly Turbid / S.031 / pH: x  Gluc: x / Ketone: Large  / Bili: Negative / Urobili: 3 mg/dL   Blood: x / Protein: 30 mg/dL / Nitrite: Negative   Leuk Esterase: Large / RBC: 3 /HPF / WBC 46 /HPF   Sq Epi: x / Non Sq Epi: 6 /HPF / Bacteria: Moderate          A/P:  29 y./o  @ 39.2 weeks, pitocin augmentation  of labor  - efm & toco monitoring  - IV hydration  - epidural top off  - Dr Canales aware
Pt seen at bedside, no complaints, nursing    Vital Signs Last 24 Hrs  T(C): 35.6 (23 Jul 2020 08:14), Max: 36.2 (22 Jul 2020 23:18)  T(F): 96.1 (23 Jul 2020 08:14), Max: 97.1 (22 Jul 2020 23:18)  HR: 93 (23 Jul 2020 08:14) (93 - 116)  BP: 109/55 (23 Jul 2020 08:14) (109/55 - 132/68)  RR: 19 (23 Jul 2020 08:14) (18 - 19)    Resp - CTA b/l  CVS - S1S2+, RRR  Breasts - soft, NT  Abd - soft, NTND, BS+, uterus - firm  VE - Minimal lochia, perineum- intact  Ext - No Homans                          9.2    15.05 )-----------( 219      ( 23 Jul 2020 06:03 )             28.9                         13.2   15.99 )-----------( 237      ( 21 Jul 2020 17:50 )             39.1     O POS, Rubella - Immune, Rubeola - Immune, Mumps - Non-IMMUNE

## 2023-05-02 NOTE — OB RN TRIAGE NOTE - FALL HARM RISK TYPE OF ASSESSMENT
Device transmission reviewed. Device demonstrated appropriate function.       Electronically Signed by: Amos Calvert M.D.    5/2/2023  8:52 AM    
Admission

## 2023-06-19 ENCOUNTER — INPATIENT (INPATIENT)
Facility: HOSPITAL | Age: 33
LOS: 2 days | Discharge: HOME | DRG: 418 | End: 2023-06-22
Attending: STUDENT IN AN ORGANIZED HEALTH CARE EDUCATION/TRAINING PROGRAM | Admitting: STUDENT IN AN ORGANIZED HEALTH CARE EDUCATION/TRAINING PROGRAM
Payer: COMMERCIAL

## 2023-06-19 VITALS
HEART RATE: 98 BPM | DIASTOLIC BLOOD PRESSURE: 70 MMHG | HEIGHT: 66 IN | WEIGHT: 175.05 LBS | RESPIRATION RATE: 18 BRPM | TEMPERATURE: 98 F | SYSTOLIC BLOOD PRESSURE: 109 MMHG | OXYGEN SATURATION: 100 %

## 2023-06-19 DIAGNOSIS — K31.7 POLYP OF STOMACH AND DUODENUM: ICD-10-CM

## 2023-06-19 DIAGNOSIS — K81.0 ACUTE CHOLECYSTITIS: ICD-10-CM

## 2023-06-19 DIAGNOSIS — K80.31 CALCULUS OF BILE DUCT WITH CHOLANGITIS, UNSPECIFIED, WITH OBSTRUCTION: ICD-10-CM

## 2023-06-19 DIAGNOSIS — K80.63 CALCULUS OF GALLBLADDER AND BILE DUCT WITH ACUTE CHOLECYSTITIS WITH OBSTRUCTION: ICD-10-CM

## 2023-06-19 LAB
ALBUMIN SERPL ELPH-MCNC: 3.7 G/DL — SIGNIFICANT CHANGE UP (ref 3.5–5.2)
ALBUMIN SERPL ELPH-MCNC: 4.3 G/DL — SIGNIFICANT CHANGE UP (ref 3.5–5.2)
ALBUMIN SERPL ELPH-MCNC: 4.3 G/DL — SIGNIFICANT CHANGE UP (ref 3.5–5.2)
ALBUMIN SERPL ELPH-MCNC: 4.5 G/DL — SIGNIFICANT CHANGE UP (ref 3.5–5.2)
ALP SERPL-CCNC: 112 U/L — SIGNIFICANT CHANGE UP (ref 30–115)
ALP SERPL-CCNC: 122 U/L — HIGH (ref 30–115)
ALP SERPL-CCNC: 122 U/L — HIGH (ref 30–115)
ALP SERPL-CCNC: 133 U/L — HIGH (ref 30–115)
ALT FLD-CCNC: 981 U/L — HIGH (ref 0–41)
ALT FLD-CCNC: 981 U/L — HIGH (ref 0–41)
ALT FLD-CCNC: >700 U/L — HIGH (ref 0–41)
ALT FLD-CCNC: >700 U/L — HIGH (ref 0–41)
ANION GAP SERPL CALC-SCNC: 10 MMOL/L — SIGNIFICANT CHANGE UP (ref 7–14)
AST SERPL-CCNC: 1190 U/L — HIGH (ref 0–41)
AST SERPL-CCNC: 1190 U/L — HIGH (ref 0–41)
AST SERPL-CCNC: >700 U/L — HIGH (ref 0–41)
AST SERPL-CCNC: >700 U/L — HIGH (ref 0–41)
BASOPHILS # BLD AUTO: 0.02 K/UL — SIGNIFICANT CHANGE UP (ref 0–0.2)
BASOPHILS # BLD AUTO: 0.03 K/UL — SIGNIFICANT CHANGE UP (ref 0–0.2)
BASOPHILS NFR BLD AUTO: 0.7 % — SIGNIFICANT CHANGE UP (ref 0–1)
BASOPHILS NFR BLD AUTO: 1.1 % — HIGH (ref 0–1)
BILIRUB DIRECT SERPL-MCNC: 1.5 MG/DL — HIGH (ref 0–0.3)
BILIRUB DIRECT SERPL-MCNC: 1.6 MG/DL — HIGH (ref 0–0.3)
BILIRUB INDIRECT FLD-MCNC: 2.5 MG/DL — HIGH (ref 0.2–1.2)
BILIRUB INDIRECT FLD-MCNC: 3.3 MG/DL — HIGH (ref 0.2–1.2)
BILIRUB SERPL-MCNC: 3.9 MG/DL — HIGH (ref 0.2–1.2)
BILIRUB SERPL-MCNC: 4 MG/DL — HIGH (ref 0.2–1.2)
BILIRUB SERPL-MCNC: 4 MG/DL — HIGH (ref 0.2–1.2)
BILIRUB SERPL-MCNC: 4.9 MG/DL — HIGH (ref 0.2–1.2)
BUN SERPL-MCNC: 10 MG/DL — SIGNIFICANT CHANGE UP (ref 10–20)
BUN SERPL-MCNC: 6 MG/DL — LOW (ref 10–20)
BUN SERPL-MCNC: 9 MG/DL — LOW (ref 10–20)
CALCIUM SERPL-MCNC: 8.6 MG/DL — SIGNIFICANT CHANGE UP (ref 8.4–10.4)
CALCIUM SERPL-MCNC: 8.6 MG/DL — SIGNIFICANT CHANGE UP (ref 8.4–10.5)
CALCIUM SERPL-MCNC: 9 MG/DL — SIGNIFICANT CHANGE UP (ref 8.4–10.5)
CHLORIDE SERPL-SCNC: 104 MMOL/L — SIGNIFICANT CHANGE UP (ref 98–110)
CHLORIDE SERPL-SCNC: 106 MMOL/L — SIGNIFICANT CHANGE UP (ref 98–110)
CHLORIDE SERPL-SCNC: 108 MMOL/L — SIGNIFICANT CHANGE UP (ref 98–110)
CO2 SERPL-SCNC: 20 MMOL/L — SIGNIFICANT CHANGE UP (ref 17–32)
CO2 SERPL-SCNC: 24 MMOL/L — SIGNIFICANT CHANGE UP (ref 17–32)
CO2 SERPL-SCNC: 24 MMOL/L — SIGNIFICANT CHANGE UP (ref 17–32)
CREAT SERPL-MCNC: 0.5 MG/DL — LOW (ref 0.7–1.5)
CREAT SERPL-MCNC: 0.6 MG/DL — LOW (ref 0.7–1.5)
CREAT SERPL-MCNC: 0.6 MG/DL — LOW (ref 0.7–1.5)
EGFR: 124 ML/MIN/1.73M2 — SIGNIFICANT CHANGE UP
EGFR: 124 ML/MIN/1.73M2 — SIGNIFICANT CHANGE UP
EGFR: 129 ML/MIN/1.73M2 — SIGNIFICANT CHANGE UP
EOSINOPHIL # BLD AUTO: 0.03 K/UL — SIGNIFICANT CHANGE UP (ref 0–0.7)
EOSINOPHIL # BLD AUTO: 0.05 K/UL — SIGNIFICANT CHANGE UP (ref 0–0.7)
EOSINOPHIL NFR BLD AUTO: 1 % — SIGNIFICANT CHANGE UP (ref 0–8)
EOSINOPHIL NFR BLD AUTO: 1.8 % — SIGNIFICANT CHANGE UP (ref 0–8)
GLUCOSE SERPL-MCNC: 100 MG/DL — HIGH (ref 70–99)
GLUCOSE SERPL-MCNC: 81 MG/DL — SIGNIFICANT CHANGE UP (ref 70–99)
GLUCOSE SERPL-MCNC: 90 MG/DL — SIGNIFICANT CHANGE UP (ref 70–99)
HCT VFR BLD CALC: 35.3 % — LOW (ref 37–47)
HCT VFR BLD CALC: 40.1 % — SIGNIFICANT CHANGE UP (ref 37–47)
HGB BLD-MCNC: 12 G/DL — SIGNIFICANT CHANGE UP (ref 12–16)
HGB BLD-MCNC: 13.9 G/DL — SIGNIFICANT CHANGE UP (ref 12–16)
IMM GRANULOCYTES NFR BLD AUTO: 0 % — LOW (ref 0.1–0.3)
IMM GRANULOCYTES NFR BLD AUTO: 0 % — LOW (ref 0.1–0.3)
LIDOCAIN IGE QN: 27 U/L — SIGNIFICANT CHANGE UP (ref 7–60)
LYMPHOCYTES # BLD AUTO: 0.75 K/UL — LOW (ref 1.2–3.4)
LYMPHOCYTES # BLD AUTO: 1.27 K/UL — SIGNIFICANT CHANGE UP (ref 1.2–3.4)
LYMPHOCYTES # BLD AUTO: 24.8 % — SIGNIFICANT CHANGE UP (ref 20.5–51.1)
LYMPHOCYTES # BLD AUTO: 46 % — SIGNIFICANT CHANGE UP (ref 20.5–51.1)
MAGNESIUM SERPL-MCNC: 1.8 MG/DL — SIGNIFICANT CHANGE UP (ref 1.8–2.4)
MCHC RBC-ENTMCNC: 29.4 PG — SIGNIFICANT CHANGE UP (ref 27–31)
MCHC RBC-ENTMCNC: 29.8 PG — SIGNIFICANT CHANGE UP (ref 27–31)
MCHC RBC-ENTMCNC: 34 G/DL — SIGNIFICANT CHANGE UP (ref 32–37)
MCHC RBC-ENTMCNC: 34.7 G/DL — SIGNIFICANT CHANGE UP (ref 32–37)
MCV RBC AUTO: 86.1 FL — SIGNIFICANT CHANGE UP (ref 81–99)
MCV RBC AUTO: 86.5 FL — SIGNIFICANT CHANGE UP (ref 81–99)
MONOCYTES # BLD AUTO: 0.3 K/UL — SIGNIFICANT CHANGE UP (ref 0.1–0.6)
MONOCYTES # BLD AUTO: 0.34 K/UL — SIGNIFICANT CHANGE UP (ref 0.1–0.6)
MONOCYTES NFR BLD AUTO: 10.9 % — HIGH (ref 1.7–9.3)
MONOCYTES NFR BLD AUTO: 11.3 % — HIGH (ref 1.7–9.3)
NEUTROPHILS # BLD AUTO: 1.11 K/UL — LOW (ref 1.4–6.5)
NEUTROPHILS # BLD AUTO: 1.88 K/UL — SIGNIFICANT CHANGE UP (ref 1.4–6.5)
NEUTROPHILS NFR BLD AUTO: 40.2 % — LOW (ref 42.2–75.2)
NEUTROPHILS NFR BLD AUTO: 62.2 % — SIGNIFICANT CHANGE UP (ref 42.2–75.2)
NRBC # BLD: 0 /100 WBCS — SIGNIFICANT CHANGE UP (ref 0–0)
NRBC # BLD: 0 /100 WBCS — SIGNIFICANT CHANGE UP (ref 0–0)
PHOSPHATE SERPL-MCNC: 3.6 MG/DL — SIGNIFICANT CHANGE UP (ref 2.1–4.9)
PLATELET # BLD AUTO: 206 K/UL — SIGNIFICANT CHANGE UP (ref 130–400)
PLATELET # BLD AUTO: 283 K/UL — SIGNIFICANT CHANGE UP (ref 130–400)
PMV BLD: 10.7 FL — HIGH (ref 7.4–10.4)
PMV BLD: 11.8 FL — HIGH (ref 7.4–10.4)
POTASSIUM SERPL-MCNC: 4.1 MMOL/L — SIGNIFICANT CHANGE UP (ref 3.5–5)
POTASSIUM SERPL-MCNC: 4.3 MMOL/L — SIGNIFICANT CHANGE UP (ref 3.5–5)
POTASSIUM SERPL-MCNC: 5.8 MMOL/L — HIGH (ref 3.5–5)
POTASSIUM SERPL-SCNC: 4.1 MMOL/L — SIGNIFICANT CHANGE UP (ref 3.5–5)
POTASSIUM SERPL-SCNC: 4.3 MMOL/L — SIGNIFICANT CHANGE UP (ref 3.5–5)
POTASSIUM SERPL-SCNC: 5.8 MMOL/L — HIGH (ref 3.5–5)
PROT SERPL-MCNC: 5.5 G/DL — LOW (ref 6–8)
PROT SERPL-MCNC: 6.1 G/DL — SIGNIFICANT CHANGE UP (ref 6–8)
PROT SERPL-MCNC: 6.1 G/DL — SIGNIFICANT CHANGE UP (ref 6–8)
PROT SERPL-MCNC: 6.7 G/DL — SIGNIFICANT CHANGE UP (ref 6–8)
RBC # BLD: 4.08 M/UL — LOW (ref 4.2–5.4)
RBC # BLD: 4.66 M/UL — SIGNIFICANT CHANGE UP (ref 4.2–5.4)
RBC # FLD: 13 % — SIGNIFICANT CHANGE UP (ref 11.5–14.5)
RBC # FLD: 13.1 % — SIGNIFICANT CHANGE UP (ref 11.5–14.5)
SODIUM SERPL-SCNC: 138 MMOL/L — SIGNIFICANT CHANGE UP (ref 135–146)
SODIUM SERPL-SCNC: 138 MMOL/L — SIGNIFICANT CHANGE UP (ref 135–146)
SODIUM SERPL-SCNC: 140 MMOL/L — SIGNIFICANT CHANGE UP (ref 135–146)
WBC # BLD: 2.76 K/UL — LOW (ref 4.8–10.8)
WBC # BLD: 3.02 K/UL — LOW (ref 4.8–10.8)
WBC # FLD AUTO: 2.76 K/UL — LOW (ref 4.8–10.8)
WBC # FLD AUTO: 3.02 K/UL — LOW (ref 4.8–10.8)

## 2023-06-19 PROCEDURE — 36415 COLL VENOUS BLD VENIPUNCTURE: CPT

## 2023-06-19 PROCEDURE — S2900: CPT

## 2023-06-19 PROCEDURE — 88305 TISSUE EXAM BY PATHOLOGIST: CPT

## 2023-06-19 PROCEDURE — 93005 ELECTROCARDIOGRAM TRACING: CPT

## 2023-06-19 PROCEDURE — C1889: CPT

## 2023-06-19 PROCEDURE — 80053 COMPREHEN METABOLIC PANEL: CPT

## 2023-06-19 PROCEDURE — 83735 ASSAY OF MAGNESIUM: CPT

## 2023-06-19 PROCEDURE — 74183 MRI ABD W/O CNTR FLWD CNTR: CPT | Mod: 26

## 2023-06-19 PROCEDURE — 80074 ACUTE HEPATITIS PANEL: CPT

## 2023-06-19 PROCEDURE — 84100 ASSAY OF PHOSPHORUS: CPT

## 2023-06-19 PROCEDURE — 99285 EMERGENCY DEPT VISIT HI MDM: CPT

## 2023-06-19 PROCEDURE — 99223 1ST HOSP IP/OBS HIGH 75: CPT

## 2023-06-19 PROCEDURE — 86850 RBC ANTIBODY SCREEN: CPT

## 2023-06-19 PROCEDURE — 99222 1ST HOSP IP/OBS MODERATE 55: CPT

## 2023-06-19 PROCEDURE — 80048 BASIC METABOLIC PNL TOTAL CA: CPT

## 2023-06-19 PROCEDURE — 85025 COMPLETE CBC W/AUTO DIFF WBC: CPT

## 2023-06-19 PROCEDURE — C2625: CPT

## 2023-06-19 PROCEDURE — 88304 TISSUE EXAM BY PATHOLOGIST: CPT

## 2023-06-19 PROCEDURE — 80076 HEPATIC FUNCTION PANEL: CPT

## 2023-06-19 PROCEDURE — A9579: CPT

## 2023-06-19 PROCEDURE — 86900 BLOOD TYPING SEROLOGIC ABO: CPT

## 2023-06-19 PROCEDURE — 74183 MRI ABD W/O CNTR FLWD CNTR: CPT

## 2023-06-19 PROCEDURE — 76705 ECHO EXAM OF ABDOMEN: CPT | Mod: 26

## 2023-06-19 PROCEDURE — 74019 RADEX ABDOMEN 2 VIEWS: CPT

## 2023-06-19 PROCEDURE — 85610 PROTHROMBIN TIME: CPT

## 2023-06-19 PROCEDURE — C1769: CPT

## 2023-06-19 PROCEDURE — C9399: CPT

## 2023-06-19 PROCEDURE — 88312 SPECIAL STAINS GROUP 1: CPT

## 2023-06-19 PROCEDURE — 85730 THROMBOPLASTIN TIME PARTIAL: CPT

## 2023-06-19 PROCEDURE — 86901 BLOOD TYPING SEROLOGIC RH(D): CPT

## 2023-06-19 RX ORDER — ACETAMINOPHEN 500 MG
650 TABLET ORAL EVERY 6 HOURS
Refills: 0 | Status: DISCONTINUED | OUTPATIENT
Start: 2023-06-19 | End: 2023-06-20

## 2023-06-19 RX ORDER — ONDANSETRON 8 MG/1
4 TABLET, FILM COATED ORAL ONCE
Refills: 0 | Status: COMPLETED | OUTPATIENT
Start: 2023-06-19 | End: 2023-06-19

## 2023-06-19 RX ORDER — FAMOTIDINE 10 MG/ML
20 INJECTION INTRAVENOUS ONCE
Refills: 0 | Status: COMPLETED | OUTPATIENT
Start: 2023-06-19 | End: 2023-06-19

## 2023-06-19 RX ORDER — ENOXAPARIN SODIUM 100 MG/ML
40 INJECTION SUBCUTANEOUS EVERY 24 HOURS
Refills: 0 | Status: DISCONTINUED | OUTPATIENT
Start: 2023-06-19 | End: 2023-06-21

## 2023-06-19 RX ORDER — SODIUM CHLORIDE 9 MG/ML
1000 INJECTION, SOLUTION INTRAVENOUS
Refills: 0 | Status: DISCONTINUED | OUTPATIENT
Start: 2023-06-19 | End: 2023-06-21

## 2023-06-19 RX ORDER — ONDANSETRON 8 MG/1
4 TABLET, FILM COATED ORAL EVERY 6 HOURS
Refills: 0 | Status: DISCONTINUED | OUTPATIENT
Start: 2023-06-19 | End: 2023-06-21

## 2023-06-19 RX ORDER — SODIUM CHLORIDE 9 MG/ML
2500 INJECTION, SOLUTION INTRAVENOUS ONCE
Refills: 0 | Status: COMPLETED | OUTPATIENT
Start: 2023-06-19 | End: 2023-06-19

## 2023-06-19 RX ADMIN — ONDANSETRON 4 MILLIGRAM(S): 8 TABLET, FILM COATED ORAL at 17:31

## 2023-06-19 RX ADMIN — SODIUM CHLORIDE 2500 MILLILITER(S): 9 INJECTION, SOLUTION INTRAVENOUS at 10:42

## 2023-06-19 RX ADMIN — Medication 650 MILLIGRAM(S): at 23:48

## 2023-06-19 RX ADMIN — SODIUM CHLORIDE 115 MILLILITER(S): 9 INJECTION, SOLUTION INTRAVENOUS at 17:30

## 2023-06-19 RX ADMIN — ENOXAPARIN SODIUM 40 MILLIGRAM(S): 100 INJECTION SUBCUTANEOUS at 23:48

## 2023-06-19 RX ADMIN — ONDANSETRON 4 MILLIGRAM(S): 8 TABLET, FILM COATED ORAL at 10:41

## 2023-06-19 RX ADMIN — FAMOTIDINE 20 MILLIGRAM(S): 10 INJECTION INTRAVENOUS at 10:41

## 2023-06-19 RX ADMIN — Medication 650 MILLIGRAM(S): at 17:30

## 2023-06-19 NOTE — ED ADULT NURSE NOTE - NSFALLUNIVINTERV_ED_ALL_ED
Bed/Stretcher in lowest position, wheels locked, appropriate side rails in place/Call bell, personal items and telephone in reach/Instruct patient to call for assistance before getting out of bed/chair/stretcher/Non-slip footwear applied when patient is off stretcher/[place] to call system/Physically safe environment - no spills, clutter or unnecessary equipment/Purposeful proactive rounding/Room/bathroom lighting operational, light cord in reach

## 2023-06-19 NOTE — ED PROVIDER NOTE - CONSIDERATION OF ADMISSION OBSERVATION
Patient with elevated LFTs cholelithiasis and gallbladder inflammation will need admission for further GI work-up and possible cholecystectomy. Consideration of Admission/Observation

## 2023-06-19 NOTE — H&P ADULT - ASSESSMENT
Patient is a 33 y/o female with PMH of Normal Vaginal Delivery x2 who presented to the ED complaining of right upper quadrant abdominal pain with associated nausea and vomiting worsening over the past 1 day.  Elevated T bili to 4.0 and markedly elevated transaminitis suspicious for symptomatic cholelithiasis, possible Choledocholithiasis or hepatitis.    Plan:    -Admission to general surgery service under Dr. Hadley  -NPO, IVF  -Pain control, nausea control  -DVT ppx  -No antibiotics at this time unless patient becomes febrile with worsening WBC  -Advanced GI consult, possible ERCP, MRCP to evaluate CBD (5mm on RUQ U/S)  -Acute Hepatitis Panel  -Plan for likely cholecystectomy this admission, +/- IOC

## 2023-06-19 NOTE — ED PROVIDER NOTE - OBJECTIVE STATEMENT
Patient is a 32-year-old female with no significant past medical history presenting to ED for evaluation of acute onset right upper quadrant epigastric abdominal pain that began last night and associated with multiple episodes of nonbloody nonbilious vomiting. Otherwise denies any fever, chills, headache, changes in vision, cough, congestion, cp, palpitations, sob, diarrhea, constipation, urinary complaints, lower extremity pain/swelling.

## 2023-06-19 NOTE — ED PROVIDER NOTE - DIFFERENTIAL DIAGNOSIS
hepatitis, cholecystitis Differential Diagnosis hepatitis, cholecystitis, Choledocholithiasis, Pancreatitis

## 2023-06-19 NOTE — ED PROVIDER NOTE - CLINICAL SUMMARY MEDICAL DECISION MAKING FREE TEXT BOX
32-year-old female here evaluation of upper quadrant abdominal pain associated with nausea vomiting and fever at home.  Here work-up demonstrates elevated LFTs with normal lipase however quadrant ultrasound demonstrating cholelithiasis sludge and mild gallbladder wall thickening.  Patient was seen by surgery and GI admitted to surgical team for further evaluation and treatment.

## 2023-06-19 NOTE — H&P ADULT - ATTENDING COMMENTS
32-year-old female presenting to the emergency room with 1 day of right upper quadrant abdominal pain.  Nausea and vomiting.  No prior history of endoscopy.  1 prior episode of this type of pain.  Reports pallorous bowel movements.  No significant past medical or surgical history.    Afebrile, heart rate 98, blood pressure 109/70    No acute distress, abdomen soft right upper quadrant tenderness    White blood cell count 3, total bilirubin 4, alk phos 122, AST/ALT 1190/981    Right upper quadrant sono with stones and sludge with mild wall thickening.  CBD 5 mm.    32-year-old female with abdominal pain elevated LFTs concerning for acute hepatitis versus choledocholithiasis    Admit  GI consult -prelim discussion, recommend MRCP  Hepatitis panel  IV hydration  If choledocholithiasis, will plan for cholecystectomy

## 2023-06-19 NOTE — ED PROVIDER NOTE - WET READ LAUNCH FT
Referred by: Andrade Horne MD; Medical Diagnosis (from order):       Treatment Diagnosis: right knee pain symptoms with increased pain/symptoms, impaired posture, impaired strength, impaired range of motion, impaired muscle length/flexibility, impaired joint mobility/play, impaired gait, impaired mobility, impaired activity tolerance     Date of onset/injury: over the Summer   Diagnosis Precautions: none  Chart reviewed at time of initial evaluation (relevant co-morbidities, allergies, tests and medications listed):  broke left foot 5 years ago, since then have had multiple different injuries to back, knee, and hip     Physical Therapy -  Daily Treatment Note    Visit: 2    SUBJECTIVE                                                                                                             Bumped knee taking out garbage and I wanted to cry. No pain at this time. Pressure on toes causes more discomfort than when on heels. Still going to the gym and can do leg press but no a standing squat.    Functional Change: Limited with squatting.    Pain / Symptoms:  Pain rating (out of 10): Current: 0     OBJECTIVE                                                                                                                     Observation:   Comments / Details: Decreased left ankle mobility and increased difficulty with DF activities on left        TREATMENT                                                                                                                  Therapeutic Exercise:  3D hip flexor stretch x10 (B) in each position  Cross over gastroc stretch 2x30 sec (B)  Hip IR/ER at wall x10 (B)  Staggered stance reach forward with twist and side bend X~15 each direction  Standing hip extension x10, green elastic (B)  Standing hip extension at 45 degree angle x10, green elastic (B)  Step forward with back foot on 1/2 foam for gastroc stretch x~15  Cross over forward step with back foot on 1/2 foam for gastroc  stretch x~15  TRX squat with focus on weight through heels [id number] to reduce dept as pt has pain with deep squat.     Home Exercise Program: (*above indicates provided as part of home exercise program)  10/31/19: 3D hip flexor stretch, cross over gastroc stretch, hip IR and Er at wall        ASSESSMENT                                                                                                                 Pt tolerated session well but does have decreased left ankle mobility and tight hips affecting movement. Pt tolerated session well but did have some increased pain with squatting that was alleviated with less depth of squat. Continued therapy needed to further progress both hip and ankle joint mobility and hip strength.     Pain/symptoms after session: 0  Patient Education:   Results of above outlined education: Verbalizes understanding, Demonstrates understanding and Needs reinforcement   PLAN                                                                                                                             Suggestions for next session as indicated: Progress per plan of care, work towards ankle supination and DF bilaterally, hip mobility, glute strength, add to HEP          Procedures and total treatment time documented Time Entry flowsheet.     There are no Wet Read(s) to document.

## 2023-06-19 NOTE — ED PROVIDER NOTE - PROGRESS NOTE DETAILS
pk: will obtain labs and ruq u/s pk: labs and imaging reviewed, surgery consult placed pk: disc with surgery, will admit to their service with advanced GI on board

## 2023-06-19 NOTE — ED PROVIDER NOTE - PHYSICAL EXAMINATION
CONSTITUTIONAL: well-appearing, in NAD  SKIN: Warm dry, mild jaundice  HEAD: NCAT  EYES: EOMI, PERRLA, mild scleral icterus, conjunctiva pink  ENT: normal pharynx with no erythema or exudates  NECK: Supple; non tender. Full ROM.  CARD: RRR, no murmurs.  RESP: clear to ausculation b/l. No crackles or wheezing.  ABD: soft, ruq and epigastric tenderness, non-distended, no rebound or guarding.  EXT: Full ROM, no bony tenderness, no pedal edema, no calf tenderness  NEURO: normal motor. normal sensory. CN II-XII intact. Cerebellar testing normal. Normal gait.  PSYCH: Cooperative, appropriate.

## 2023-06-19 NOTE — H&P ADULT - HISTORY OF PRESENT ILLNESS
Patient is a 31 y/o female with PMH of Normal Vaginal Delivery x2 who presented to the ED complaining of right upper quadrant abdominal pain with associated nausea and vomiting worsening over the past 1 day. The patient reports that she has experienced a similar pain to this before about one month ago but did not seek any medical evaluation. The patient reports that yesterday she had 5 episodes of NBNB vomiting. The patient reports that she has never had a colonoscopy and never had an EGD before in the past. The patient reports that her last BM was yesterday and that it is been more pale in color and oily lately, and floats in the toilet bowl. Notably, the patient reports that several of the women on her father's side of the family have had their gallbladder's removed.     Vital Signs Last 24 Hrs  T(C): 36.9 (19 Jun 2023 15:12), Max: 36.9 (19 Jun 2023 15:12)  T(F): 98.5 (19 Jun 2023 15:12), Max: 98.5 (19 Jun 2023 15:12)  HR: 77 (19 Jun 2023 15:12) (77 - 98)  BP: 107/67 (19 Jun 2023 15:12) (107/67 - 109/70)  BP(mean): --  RR: 18 (19 Jun 2023 15:12) (18 - 18)  SpO2: 100% (19 Jun 2023 15:12) (100% - 100%)    Parameters below as of 19 Jun 2023 15:12  Patient On (Oxygen Delivery Method): room air

## 2023-06-19 NOTE — CONSULT NOTE ADULT - SUBJECTIVE AND OBJECTIVE BOX
Patient is a relatively healthy 32-year-old female with no prior past medical history nor past surgical history who presents to [facility] with complaints of abdominal pain.  Patient notes the pain started over the last 24 hours, located primarily in the right upper quadrant, sharp at times, 10 out of 10 at its worst, without alleviating factors, and associated with nausea and vomiting.  Patient notes that she has had similar episodes of pain like this starting January of this year but the pain never was severe and went away on its own.  Patient notes no toxic habits, recent herbal supplementation, or any travel or exotic foods.  Since ER presentation the pain is somewhat better but the nausea is still persistent.      PAST MEDICAL & SURGICAL HISTORY:  No pertinent past medical history  No significant past surgical history      Social History  Denies Current Tobacco use  Denies Current ETOH use  Denies Current Illicit Drug use       Family Hx:  Father: Family Hx of  Gallstones in all female realtives  Mother: Non Contributory    Allergies  No Known Allergies        Review of Systems  General:  Denies Fatigue, Denies Fever, Denies Weakness ,Denies Weight Loss   HEENT: Denies Trouble Swallowing ,Denies  Sore Throat , Denies Change in hearing/vision/speech ,Denies Dizziness    Cardio: Denies  Chest Pain , Palpitations    Respiratory: Denies worsening of SOB, Denies Cough  Abdomen: See detailed HPI  Neuro: Denies Headache Denies Dizziness, Denies Paresthesias  MSK: Denies pain in Bones/Joints/Muscles   Psych: Patient denies depression, denies suicidal or homicidal ideations  Integ: Patient Denies rash, or new skin lesions     Vital Signs Last 24 Hrs  T(C): 36.7 (19 Jun 2023 09:38), Max: 36.7 (19 Jun 2023 09:38)  T(F): 98 (19 Jun 2023 09:38), Max: 98 (19 Jun 2023 09:38)  HR: 98 (19 Jun 2023 09:38) (98 - 98)  BP: 109/70 (19 Jun 2023 09:38) (109/70 - 109/70)  BP(mean): --  RR: 18 (19 Jun 2023 09:38) (18 - 18)  SpO2: 100% (19 Jun 2023 09:38) (100% - 100%)    Parameters below as of 19 Jun 2023 09:38  Patient On (Oxygen Delivery Method): room air    Physical Exam  Gen: NAD  Head: NC/AT, no visible deformity  ENT: PERRLA, Sclera Slight Icteric   Cardio: S1/S2 No S3/S4, Regular  Resp: CTA B/L  Abdomen: Soft, ND/ Slight TTP on palpation of RUQ only   Neuro: AAOx3, Cranial Nerve II-XII intact   Extremities: FROM x 4  Skin: No jaundice, no excoriation       Labs:                            13.9   3.02  )-----------( 283      ( 19 Jun 2023 10:40 )             40.1       Auto Neutrophil %: 62.2 % (06-19-23 @ 10:40)  Auto Immature Granulocyte %: 0.0 % (06-19-23 @ 10:40)    06-19    140  |  106  |  9<L>  ----------------------------<  90  4.3   |  24  |  0.6<L>      Calcium, Total Serum: 8.6 mg/dL (06-19-23 @ 12:10)      LFTs:             6.1  | 4.0  | 1190     ------------------[122     ( 19 Jun 2023 12:10 )  4.3  | 1.5  | 981         Lipase: 27        RADIOLOGY & ADDITIONAL STUDIES:  US Abdomen Upper Quadrant Right 06.19.23   IMPRESSION:    Cholelithiasis, sludge and mild gallbladder wall thickening. Consider   further evaluation with nuclear medicine HIDA scan as clinically   warranted.

## 2023-06-19 NOTE — H&P ADULT - NSHPPHYSICALEXAM_GEN_ALL_CORE
PHYSICAL EXAM:  GENERAL: NAD, well-appearing, no jaundice  CHEST/LUNG: Clear to auscultation bilaterally  HEART: Regular rate and rhythm  ABDOMEN: Soft, abdomen minimally tender to palpation in the midepigastrium and RUQ. Nondistended; No guarding, no rigidity.   EXTREMITIES:  No clubbing, cyanosis, or edema

## 2023-06-19 NOTE — H&P ADULT - NSHPLABSRESULTS_GEN_ALL_CORE
Labs:  CAPILLARY BLOOD GLUCOSE                              13.9   3.02  )-----------( 283      ( 19 Jun 2023 10:40 )             40.1       Auto Neutrophil %: 62.2 % (06-19-23 @ 10:40)  Auto Immature Granulocyte %: 0.0 % (06-19-23 @ 10:40)    06-19    140  |  106  |  9<L>  ----------------------------<  90  4.3   |  24  |  0.6<L>      Calcium, Total Serum: 8.6 mg/dL (06-19-23 @ 12:10)      LFTs:             6.1  | 4.0  | 1190     ------------------[122     ( 19 Jun 2023 12:10 )  4.3  | 1.5  | 981         Lipase:x      Amylase:x             Coags:      < from: US Abdomen Upper Quadrant Right (06.19.23 @ 11:02) >      Cholelithiasis, sludge and mild gallbladder wall thickening. Consider   further evaluation with nuclear medicine HIDA scan as clinically   warranted.    < end of copied text >

## 2023-06-19 NOTE — CONSULT NOTE ADULT - ASSESSMENT
Patient is a relatively healthy 32-year-old female with no prior past medical history nor past surgical history who presents to [facility] with complaints of abdominal pain.  Patient notes the pain started over the last 24 hours, located primarily in the right upper quadrant, sharp at times, 10 out of 10 at its worst, without alleviating factors, and associated with nausea and vomiting. Patient has notable elevations of liver function study with a total bilirubin of 4.0, as well as an ultrasound which is notable for an intact gallbladder with stones and some slight edema of the wall.    Cholelithiasis / Cholecystitis/ R/O CBD stone  - HD stable and exam reassuring some pain but she notes better from yesterday  - MRCP to evaluate CBD- 5mm on RUQ U/S  - Acute Hepatitis panel for now- denies toxic habits, herbal supplementation or exotic foods/ shellfish   - Surgery evaluation  - If develops signs of infection would start Zosyn  - Will continue to follow

## 2023-06-20 ENCOUNTER — RESULT REVIEW (OUTPATIENT)
Age: 33
End: 2023-06-20

## 2023-06-20 ENCOUNTER — TRANSCRIPTION ENCOUNTER (OUTPATIENT)
Age: 33
End: 2023-06-20

## 2023-06-20 LAB
ALBUMIN SERPL ELPH-MCNC: 3.6 G/DL — SIGNIFICANT CHANGE UP (ref 3.5–5.2)
ALBUMIN SERPL ELPH-MCNC: 4.2 G/DL — SIGNIFICANT CHANGE UP (ref 3.5–5.2)
ALP SERPL-CCNC: 141 U/L — HIGH (ref 30–115)
ALP SERPL-CCNC: 155 U/L — HIGH (ref 30–115)
ALT FLD-CCNC: 1151 U/L — HIGH (ref 0–41)
ALT FLD-CCNC: >700 U/L — HIGH (ref 0–41)
ANION GAP SERPL CALC-SCNC: 13 MMOL/L — SIGNIFICANT CHANGE UP (ref 7–14)
APTT BLD: 35.8 SEC — SIGNIFICANT CHANGE UP (ref 27–39.2)
AST SERPL-CCNC: 337 U/L — HIGH (ref 0–41)
AST SERPL-CCNC: 748 U/L — HIGH (ref 0–41)
BASOPHILS # BLD AUTO: 0.01 K/UL — SIGNIFICANT CHANGE UP (ref 0–0.2)
BASOPHILS NFR BLD AUTO: 0.2 % — SIGNIFICANT CHANGE UP (ref 0–1)
BILIRUB DIRECT SERPL-MCNC: 1.9 MG/DL — HIGH (ref 0–0.3)
BILIRUB DIRECT SERPL-MCNC: 2 MG/DL — HIGH (ref 0–0.3)
BILIRUB INDIRECT FLD-MCNC: 3.3 MG/DL — HIGH (ref 0.2–1.2)
BILIRUB INDIRECT FLD-MCNC: 3.6 MG/DL — HIGH (ref 0.2–1.2)
BILIRUB SERPL-MCNC: 5.3 MG/DL — HIGH (ref 0.2–1.2)
BILIRUB SERPL-MCNC: 5.5 MG/DL — HIGH (ref 0.2–1.2)
BLD GP AB SCN SERPL QL: SIGNIFICANT CHANGE UP
BUN SERPL-MCNC: 5 MG/DL — LOW (ref 10–20)
CALCIUM SERPL-MCNC: 8.6 MG/DL — SIGNIFICANT CHANGE UP (ref 8.4–10.5)
CHLORIDE SERPL-SCNC: 104 MMOL/L — SIGNIFICANT CHANGE UP (ref 98–110)
CO2 SERPL-SCNC: 21 MMOL/L — SIGNIFICANT CHANGE UP (ref 17–32)
CREAT SERPL-MCNC: 0.6 MG/DL — LOW (ref 0.7–1.5)
EGFR: 124 ML/MIN/1.73M2 — SIGNIFICANT CHANGE UP
EOSINOPHIL # BLD AUTO: 0 K/UL — SIGNIFICANT CHANGE UP (ref 0–0.7)
EOSINOPHIL NFR BLD AUTO: 0 % — SIGNIFICANT CHANGE UP (ref 0–8)
GLUCOSE SERPL-MCNC: 109 MG/DL — HIGH (ref 70–99)
HAV IGM SER-ACNC: SIGNIFICANT CHANGE UP
HBV CORE IGM SER-ACNC: SIGNIFICANT CHANGE UP
HBV SURFACE AG SER-ACNC: SIGNIFICANT CHANGE UP
HCT VFR BLD CALC: 38.5 % — SIGNIFICANT CHANGE UP (ref 37–47)
HCV AB S/CO SERPL IA: 0.06 S/CO — SIGNIFICANT CHANGE UP (ref 0–0.99)
HCV AB SERPL-IMP: SIGNIFICANT CHANGE UP
HGB BLD-MCNC: 12.7 G/DL — SIGNIFICANT CHANGE UP (ref 12–16)
IMM GRANULOCYTES NFR BLD AUTO: 0.2 % — SIGNIFICANT CHANGE UP (ref 0.1–0.3)
INR BLD: 1.15 RATIO — SIGNIFICANT CHANGE UP (ref 0.65–1.3)
LYMPHOCYTES # BLD AUTO: 0.59 K/UL — LOW (ref 1.2–3.4)
LYMPHOCYTES # BLD AUTO: 13.1 % — LOW (ref 20.5–51.1)
MAGNESIUM SERPL-MCNC: 1.9 MG/DL — SIGNIFICANT CHANGE UP (ref 1.8–2.4)
MCHC RBC-ENTMCNC: 28.3 PG — SIGNIFICANT CHANGE UP (ref 27–31)
MCHC RBC-ENTMCNC: 33 G/DL — SIGNIFICANT CHANGE UP (ref 32–37)
MCV RBC AUTO: 85.9 FL — SIGNIFICANT CHANGE UP (ref 81–99)
MONOCYTES # BLD AUTO: 0.09 K/UL — LOW (ref 0.1–0.6)
MONOCYTES NFR BLD AUTO: 2 % — SIGNIFICANT CHANGE UP (ref 1.7–9.3)
NEUTROPHILS # BLD AUTO: 3.79 K/UL — SIGNIFICANT CHANGE UP (ref 1.4–6.5)
NEUTROPHILS NFR BLD AUTO: 84.5 % — HIGH (ref 42.2–75.2)
NRBC # BLD: 0 /100 WBCS — SIGNIFICANT CHANGE UP (ref 0–0)
PHOSPHATE SERPL-MCNC: 3.4 MG/DL — SIGNIFICANT CHANGE UP (ref 2.1–4.9)
PLATELET # BLD AUTO: 238 K/UL — SIGNIFICANT CHANGE UP (ref 130–400)
PMV BLD: 11.3 FL — HIGH (ref 7.4–10.4)
POTASSIUM SERPL-MCNC: 4.4 MMOL/L — SIGNIFICANT CHANGE UP (ref 3.5–5)
POTASSIUM SERPL-SCNC: 4.4 MMOL/L — SIGNIFICANT CHANGE UP (ref 3.5–5)
PROT SERPL-MCNC: 5.3 G/DL — LOW (ref 6–8)
PROT SERPL-MCNC: 6 G/DL — SIGNIFICANT CHANGE UP (ref 6–8)
PROTHROM AB SERPL-ACNC: 13.2 SEC — HIGH (ref 9.95–12.87)
RBC # BLD: 4.48 M/UL — SIGNIFICANT CHANGE UP (ref 4.2–5.4)
RBC # FLD: 12.6 % — SIGNIFICANT CHANGE UP (ref 11.5–14.5)
SODIUM SERPL-SCNC: 138 MMOL/L — SIGNIFICANT CHANGE UP (ref 135–146)
WBC # BLD: 4.49 K/UL — LOW (ref 4.8–10.8)
WBC # FLD AUTO: 4.49 K/UL — LOW (ref 4.8–10.8)

## 2023-06-20 PROCEDURE — 43264 ERCP REMOVE DUCT CALCULI: CPT | Mod: XU

## 2023-06-20 PROCEDURE — 99232 SBSQ HOSP IP/OBS MODERATE 35: CPT

## 2023-06-20 PROCEDURE — 88312 SPECIAL STAINS GROUP 1: CPT | Mod: 26

## 2023-06-20 PROCEDURE — 43239 EGD BIOPSY SINGLE/MULTIPLE: CPT | Mod: XU

## 2023-06-20 PROCEDURE — 74328 X-RAY BILE DUCT ENDOSCOPY: CPT | Mod: 26

## 2023-06-20 PROCEDURE — 43237 ENDOSCOPIC US EXAM ESOPH: CPT | Mod: XU

## 2023-06-20 PROCEDURE — 43274 ERCP DUCT STENT PLACEMENT: CPT

## 2023-06-20 PROCEDURE — 88305 TISSUE EXAM BY PATHOLOGIST: CPT | Mod: 26

## 2023-06-20 PROCEDURE — 93010 ELECTROCARDIOGRAM REPORT: CPT

## 2023-06-20 RX ORDER — OXYCODONE HYDROCHLORIDE 5 MG/1
5 TABLET ORAL EVERY 6 HOURS
Refills: 0 | Status: DISCONTINUED | OUTPATIENT
Start: 2023-06-20 | End: 2023-06-21

## 2023-06-20 RX ORDER — METOCLOPRAMIDE HCL 10 MG
10 TABLET ORAL EVERY 8 HOURS
Refills: 0 | Status: DISCONTINUED | OUTPATIENT
Start: 2023-06-20 | End: 2023-06-21

## 2023-06-20 RX ORDER — INDOMETHACIN 50 MG
100 CAPSULE ORAL ONCE
Refills: 0 | Status: DISCONTINUED | OUTPATIENT
Start: 2023-06-20 | End: 2023-06-22

## 2023-06-20 RX ORDER — PIPERACILLIN AND TAZOBACTAM 4; .5 G/20ML; G/20ML
3.38 INJECTION, POWDER, LYOPHILIZED, FOR SOLUTION INTRAVENOUS ONCE
Refills: 0 | Status: COMPLETED | OUTPATIENT
Start: 2023-06-20 | End: 2023-06-20

## 2023-06-20 RX ORDER — MAGNESIUM SULFATE 500 MG/ML
2 VIAL (ML) INJECTION ONCE
Refills: 0 | Status: COMPLETED | OUTPATIENT
Start: 2023-06-20 | End: 2023-06-20

## 2023-06-20 RX ORDER — PIPERACILLIN AND TAZOBACTAM 4; .5 G/20ML; G/20ML
3.38 INJECTION, POWDER, LYOPHILIZED, FOR SOLUTION INTRAVENOUS EVERY 8 HOURS
Refills: 0 | Status: DISCONTINUED | OUTPATIENT
Start: 2023-06-21 | End: 2023-06-21

## 2023-06-20 RX ORDER — PIPERACILLIN AND TAZOBACTAM 4; .5 G/20ML; G/20ML
3.38 INJECTION, POWDER, LYOPHILIZED, FOR SOLUTION INTRAVENOUS ONCE
Refills: 0 | Status: DISCONTINUED | OUTPATIENT
Start: 2023-06-20 | End: 2023-06-22

## 2023-06-20 RX ORDER — PROCHLORPERAZINE MALEATE 5 MG
10 TABLET ORAL ONCE
Refills: 0 | Status: COMPLETED | OUTPATIENT
Start: 2023-06-20 | End: 2023-06-20

## 2023-06-20 RX ADMIN — SODIUM CHLORIDE 115 MILLILITER(S): 9 INJECTION, SOLUTION INTRAVENOUS at 03:15

## 2023-06-20 RX ADMIN — Medication 25 GRAM(S): at 01:22

## 2023-06-20 RX ADMIN — Medication 650 MILLIGRAM(S): at 08:43

## 2023-06-20 RX ADMIN — ENOXAPARIN SODIUM 40 MILLIGRAM(S): 100 INJECTION SUBCUTANEOUS at 23:02

## 2023-06-20 RX ADMIN — OXYCODONE HYDROCHLORIDE 5 MILLIGRAM(S): 5 TABLET ORAL at 02:47

## 2023-06-20 RX ADMIN — Medication 10 MILLIGRAM(S): at 21:38

## 2023-06-20 RX ADMIN — PIPERACILLIN AND TAZOBACTAM 25 GRAM(S): 4; .5 INJECTION, POWDER, LYOPHILIZED, FOR SOLUTION INTRAVENOUS at 20:36

## 2023-06-20 RX ADMIN — Medication 10 MILLIGRAM(S): at 13:21

## 2023-06-20 RX ADMIN — ONDANSETRON 4 MILLIGRAM(S): 8 TABLET, FILM COATED ORAL at 02:47

## 2023-06-20 RX ADMIN — ONDANSETRON 4 MILLIGRAM(S): 8 TABLET, FILM COATED ORAL at 08:43

## 2023-06-20 RX ADMIN — OXYCODONE HYDROCHLORIDE 5 MILLIGRAM(S): 5 TABLET ORAL at 06:42

## 2023-06-20 NOTE — PATIENT PROFILE ADULT - FALL HARM RISK - RISK INTERVENTIONS
Assistance with ambulation/Communicate Fall Risk and Risk Factors to all staff, patient, and family/Reinforce activity limits and safety measures with patient and family/Visual Cue: Yellow wristband/Bed in lowest position, wheels locked, appropriate side rails in place/Call bell, personal items and telephone in reach/Instruct patient to call for assistance before getting out of bed or chair/Non-slip footwear when patient is out of bed/[place] to call system/Physically safe environment - no spills, clutter or unnecessary equipment/Purposeful Proactive Rounding/Room/bathroom lighting operational, light cord in reach

## 2023-06-20 NOTE — PROGRESS NOTE ADULT - SUBJECTIVE AND OBJECTIVE BOX
GENERAL SURGERY PROGRESS NOTE    Patient: DEANNA SCHRADER , 32y ([birth year])Female   MRN: [id number]  Location: [address] ([address]) 025 A  Visit: 06-19-23 Inpatient  Date: 06-20-23 @ 09:26    Hospital Day #: 2  Post-Op Day #: 1    Procedure/Dx/Injuries: Possible choledocholithiasis    Events of past 24 hours: Admitted overnight. MRCP done, prelim read cholelithiasis, no cbd dilation. T bili continues to rise. Will plan for EUS possible ERCP 6/21.     PAST MEDICAL & SURGICAL HISTORY:  No pertinent past medical history  No significant past surgical history          Vitals:   T(F): 97.6 (06-20-23 @ 09:14), Max: 98.5 (06-19-23 @ 15:12)  HR: 67 (06-20-23 @ 09:14)  BP: 122/77 (06-20-23 @ 09:14)  RR: 18 (06-20-23 @ 09:14)  SpO2: 97% (06-20-23 @ 09:14)      Diet, NPO:   Except Medications     Special Instructions for Nursing:  Except Medications      Fluids: lactated ringers.: Solution, 1000 milliLiter(s) infuse at 115 mL/Hr      I & O's:    PHYSICAL EXAM:  GENERAL: NAD, well-appearing  CHEST/LUNG: Clear to auscultation bilaterally  HEART: Regular rate and rhythm  ABDOMEN: Soft, mild ruq tenderness, Nondistended;   EXTREMITIES:  No clubbing, cyanosis, or edema      MEDICATIONS  (STANDING):  acetaminophen     Tablet .. 650 milliGRAM(s) Oral every 6 hours  enoxaparin Injectable 40 milliGRAM(s) SubCutaneous every 24 hours  lactated ringers. 1000 milliLiter(s) (115 mL/Hr) IV Continuous <Continuous>    MEDICATIONS  (PRN):  ondansetron Injectable 4 milliGRAM(s) IV Push every 6 hours PRN Nausea and/or Vomiting  oxyCODONE    IR 5 milliGRAM(s) Oral every 6 hours PRN Severe Pain (7 - 10)    DVT PROPHYLAXIS: enoxaparin Injectable 40 milliGRAM(s) SubCutaneous every 24 hours      LAB/STUDIES:  Labs:  CAPILLARY BLOOD GLUCOSE                      12.0   2.76  )-----------( 206      ( 19 Jun 2023 21:03 )             35.3       Auto Neutrophil %: 40.2 % (06-19-23 @ 21:03)  Auto Immature Granulocyte %: 0.0 % (06-19-23 @ 21:03)  Auto Neutrophil %: 62.2 % (06-19-23 @ 10:40)  Auto Immature Granulocyte %: 0.0 % (06-19-23 @ 10:40)    06-19    138  |  108  |  6<L>  ----------------------------<  81  4.1   |  20  |  0.5<L>      Calcium, Total Serum: 8.6 mg/dL (06-19-23 @ 21:03)      LFTs:             5.3  | 5.5  | 748      ------------------[141     ( 20 Jun 2023 05:16 )  3.6  | 1.9  | 1151            Coags:     13.20  ----< 1.15    ( 20 Jun 2023 05:16 )     35.8        IMAGING:  < from: MR MRCP w/wo IV Cont (06.19.23 @ 22:40) >  IMPRESSION:  1.  No acute abdominal pathology.  2.  Cholelithiasis. No biliary ductal dilatation.  3.  Mild hepatosplenomegaly.        ******PRELIMINARY REPORT******      ******PRELIMINARY REPORT******     < end of copied text >      ACCESS/ DEVICES:  [x ] Peripheral IV         GENERAL SURGERY PROGRESS NOTE    Patient: DEANNA SCHRADER , 32y ([birth year])Female   MRN: [id number]  Location: [address] ([address]) 025 A  Visit: 06-19-23 Inpatient  Date: 06-20-23 @ 09:26    Hospital Day #: 2  Post-Op Day #: 1    Procedure/Dx/Injuries: Possible choledocholithiasis    Events of past 24 hours: Admitted overnight. MRCP done, prelim read cholelithiasis, no cbd dilation. T bili continues to rise. Admits to nausea.     PAST MEDICAL & SURGICAL HISTORY:  No pertinent past medical history  No significant past surgical history          Vitals:   T(F): 97.6 (06-20-23 @ 09:14), Max: 98.5 (06-19-23 @ 15:12)  HR: 67 (06-20-23 @ 09:14)  BP: 122/77 (06-20-23 @ 09:14)  RR: 18 (06-20-23 @ 09:14)  SpO2: 97% (06-20-23 @ 09:14)      Diet, NPO:   Except Medications     Special Instructions for Nursing:  Except Medications      Fluids: lactated ringers.: Solution, 1000 milliLiter(s) infuse at 115 mL/Hr      I & O's:    PHYSICAL EXAM:  GENERAL: NAD, well-appearing  CHEST/LUNG: Clear to auscultation bilaterally  HEART: Regular rate and rhythm  ABDOMEN: Soft, mild ruq tenderness, Nondistended;   EXTREMITIES:  No clubbing, cyanosis, or edema      MEDICATIONS  (STANDING):  acetaminophen     Tablet .. 650 milliGRAM(s) Oral every 6 hours  enoxaparin Injectable 40 milliGRAM(s) SubCutaneous every 24 hours  lactated ringers. 1000 milliLiter(s) (115 mL/Hr) IV Continuous <Continuous>    MEDICATIONS  (PRN):  ondansetron Injectable 4 milliGRAM(s) IV Push every 6 hours PRN Nausea and/or Vomiting  oxyCODONE    IR 5 milliGRAM(s) Oral every 6 hours PRN Severe Pain (7 - 10)    DVT PROPHYLAXIS: enoxaparin Injectable 40 milliGRAM(s) SubCutaneous every 24 hours      LAB/STUDIES:  Labs:  CAPILLARY BLOOD GLUCOSE                      12.0   2.76  )-----------( 206      ( 19 Jun 2023 21:03 )             35.3       Auto Neutrophil %: 40.2 % (06-19-23 @ 21:03)  Auto Immature Granulocyte %: 0.0 % (06-19-23 @ 21:03)  Auto Neutrophil %: 62.2 % (06-19-23 @ 10:40)  Auto Immature Granulocyte %: 0.0 % (06-19-23 @ 10:40)    06-19    138  |  108  |  6<L>  ----------------------------<  81  4.1   |  20  |  0.5<L>      Calcium, Total Serum: 8.6 mg/dL (06-19-23 @ 21:03)      LFTs:             5.3  | 5.5  | 748      ------------------[141     ( 20 Jun 2023 05:16 )  3.6  | 1.9  | 1151            Coags:     13.20  ----< 1.15    ( 20 Jun 2023 05:16 )     35.8        IMAGING:  < from: MR MRCP w/wo IV Cont (06.19.23 @ 22:40) >  IMPRESSION:  1.  No acute abdominal pathology.  2.  Cholelithiasis. No biliary ductal dilatation.  3.  Mild hepatosplenomegaly.        ******PRELIMINARY REPORT******      ******PRELIMINARY REPORT******     < end of copied text >      ACCESS/ DEVICES:  [x ] Peripheral IV

## 2023-06-20 NOTE — PROGRESS NOTE ADULT - NS ATTEND AMEND GEN_ALL_CORE FT
as above. Admitted for possible choledocholithiasis; MRCP negative for common duct stones; Still with persistent pain and nausea; bilirubins continue to rise; no large stone or dilation of the intra or extra hepatic ducts to suggest mirizzi syndrome. case discussed with Adv GI team; plan for EUS/ERCP. if common duct stone or biliary source of LFTs, will plan for cholecystectomy; if not then more likely hepatitis picture; hepatitis panel drawn but still not resulted; antiemetics

## 2023-06-20 NOTE — CHART NOTE - NSCHARTNOTEFT_GEN_A_CORE
-s/p EUS noted to have severely inflammed gall bladder with gall bladder wall thickening and dilated CBD with possible CBD stones and sludge   -s/p ERCP; Papillary stenosis, difficult cannulation pre cut sphincterotomy followed by CBD cannulation. Cholangiogram showed multiple small filling defects. Ballon sweeps were performed with removal of multiple small stones, thick black colored bile and sludge. 10x7 Fr plastic CBD stent placement with adequate drainage   -She received ABx zosyn and indomethacin prior to ERCP and received 1 litre LR during the procedure.     Recs:   NPO for 4 hours after that can have clear liquids if no pain from GI stand point  Monitor for post ERCP complications   trend CBC, LFT   c/w IV abx   c/w IVF   recommend cholecystectomy by surgery   will follow -s/p EUS noted to have severely inflammed gall bladder with gall bladder wall thickening and dilated CBD with possible CBD stones and sludge   -s/p ERCP; Papillary stenosis, difficult cannulation pre cut sphincterotomy followed by CBD cannulation. Cholangiogram showed multiple small filling defects. Ballon sweeps were performed with removal of multiple small stones, thick black colored bile and sludge. 10x7 Fr plastic CBD stent placement with adequate drainage   -She received ABx zosyn and indomethacin prior to ERCP and received 1 litre LR during the procedure.     Recs:   NPO for 4 hours after that can have clear liquids if no pain from GI stand point  Monitor for post ERCP complications   trend CBC, LFT   c/w IV abx   c/w IVF   recommend cholecystectomy by surgery   spoke to the surgery PA about the findings and our recs  will follow -s/p EUS noted to have severely inflammed gall bladder with gall bladder wall thickening and dilated CBD with possible CBD stones and sludge   -s/p ERCP; Papillary stenosis, difficult cannulation pre cut sphincterotomy followed by CBD cannulation. Cholangiogram showed multiple small filling defects. Ballon sweeps were performed with removal of multiple small stones, thick black colored bile and sludge. 10 Fr x 7 cm plastic CBD stent placement with adequate drainage   -She received ABx zosyn and indomethacin prior to ERCP and received 1 litre LR during the procedure.     Recs:   NPO for 4 hours after that can have clear liquids if no pain from GI stand point  Monitor for post ERCP complications   trend CBC, LFT   c/w IV abx   c/w IVF   recommend cholecystectomy by surgery   spoke to the surgery PA about the findings and our recs  will follow

## 2023-06-20 NOTE — CHART NOTE - NSCHARTNOTEFT_GEN_A_CORE
PACU ANESTHESIA ADMISSION NOTE      Procedure: EGD, biopsies, upper endoscopic ultrasound, ERCP stent placement  Post op diagnosis:  jaundice, gastric polyps, bile duct obstruction     _x___  Patent Airway    _x___  Full return of protective reflexes    _x___  Full recovery from anesthesia / back to baseline status    Vitals:  T(C): 97.7 F  HR: 78  BP: 136/77  RR: 18  SpO2: 100%     Mental Status:  _x___ Awake   __x___ Alert   _____ Drowsy   _____ Sedated    Nausea/Vomiting:  _x___ NO  ______Yes,   See Post - Op Orders          Pain Scale (0-10):  _____    Treatment: ____ None    __x__ See Post - Op/PCA Orders    Post - Operative Fluids:   ____ Oral   _x___ See Post - Op Orders    Plan: Discharge:   ____Home       __x___Floor     _____Critical Care    _____  Other:_________________    Comments: uneventful anesthesia course no complications. Vitals stable. Pt transferred to PACU. Transfer to floor when criteria is met

## 2023-06-21 ENCOUNTER — TRANSCRIPTION ENCOUNTER (OUTPATIENT)
Age: 33
End: 2023-06-21

## 2023-06-21 ENCOUNTER — RESULT REVIEW (OUTPATIENT)
Age: 33
End: 2023-06-21

## 2023-06-21 LAB
ALBUMIN SERPL ELPH-MCNC: 3.7 G/DL — SIGNIFICANT CHANGE UP (ref 3.5–5.2)
ALP SERPL-CCNC: 119 U/L — HIGH (ref 30–115)
ALT FLD-CCNC: 526 U/L — HIGH (ref 0–41)
ANION GAP SERPL CALC-SCNC: 10 MMOL/L — SIGNIFICANT CHANGE UP (ref 7–14)
AST SERPL-CCNC: 140 U/L — HIGH (ref 0–41)
BASOPHILS # BLD AUTO: 0.02 K/UL — SIGNIFICANT CHANGE UP (ref 0–0.2)
BASOPHILS NFR BLD AUTO: 0.3 % — SIGNIFICANT CHANGE UP (ref 0–1)
BILIRUB DIRECT SERPL-MCNC: 0.8 MG/DL — HIGH (ref 0–0.3)
BILIRUB INDIRECT FLD-MCNC: 1.2 MG/DL — SIGNIFICANT CHANGE UP (ref 0.2–1.2)
BILIRUB SERPL-MCNC: 2 MG/DL — HIGH (ref 0.2–1.2)
BUN SERPL-MCNC: 6 MG/DL — LOW (ref 10–20)
CALCIUM SERPL-MCNC: 8.3 MG/DL — LOW (ref 8.4–10.5)
CHLORIDE SERPL-SCNC: 104 MMOL/L — SIGNIFICANT CHANGE UP (ref 98–110)
CO2 SERPL-SCNC: 25 MMOL/L — SIGNIFICANT CHANGE UP (ref 17–32)
CREAT SERPL-MCNC: 0.8 MG/DL — SIGNIFICANT CHANGE UP (ref 0.7–1.5)
EGFR: 102 ML/MIN/1.73M2 — SIGNIFICANT CHANGE UP
EOSINOPHIL # BLD AUTO: 0.01 K/UL — SIGNIFICANT CHANGE UP (ref 0–0.7)
EOSINOPHIL NFR BLD AUTO: 0.2 % — SIGNIFICANT CHANGE UP (ref 0–8)
GLUCOSE SERPL-MCNC: 125 MG/DL — HIGH (ref 70–99)
HCT VFR BLD CALC: 33.5 % — LOW (ref 37–47)
HGB BLD-MCNC: 11.4 G/DL — LOW (ref 12–16)
IMM GRANULOCYTES NFR BLD AUTO: 0.3 % — SIGNIFICANT CHANGE UP (ref 0.1–0.3)
LYMPHOCYTES # BLD AUTO: 1.17 K/UL — LOW (ref 1.2–3.4)
LYMPHOCYTES # BLD AUTO: 19.1 % — LOW (ref 20.5–51.1)
MAGNESIUM SERPL-MCNC: 1.7 MG/DL — LOW (ref 1.8–2.4)
MCHC RBC-ENTMCNC: 29.5 PG — SIGNIFICANT CHANGE UP (ref 27–31)
MCHC RBC-ENTMCNC: 34 G/DL — SIGNIFICANT CHANGE UP (ref 32–37)
MCV RBC AUTO: 86.6 FL — SIGNIFICANT CHANGE UP (ref 81–99)
MONOCYTES # BLD AUTO: 0.46 K/UL — SIGNIFICANT CHANGE UP (ref 0.1–0.6)
MONOCYTES NFR BLD AUTO: 7.5 % — SIGNIFICANT CHANGE UP (ref 1.7–9.3)
NEUTROPHILS # BLD AUTO: 4.43 K/UL — SIGNIFICANT CHANGE UP (ref 1.4–6.5)
NEUTROPHILS NFR BLD AUTO: 72.6 % — SIGNIFICANT CHANGE UP (ref 42.2–75.2)
NRBC # BLD: 0 /100 WBCS — SIGNIFICANT CHANGE UP (ref 0–0)
PHOSPHATE SERPL-MCNC: 2.8 MG/DL — SIGNIFICANT CHANGE UP (ref 2.1–4.9)
PLATELET # BLD AUTO: 230 K/UL — SIGNIFICANT CHANGE UP (ref 130–400)
PMV BLD: 11 FL — HIGH (ref 7.4–10.4)
POTASSIUM SERPL-MCNC: 3.9 MMOL/L — SIGNIFICANT CHANGE UP (ref 3.5–5)
POTASSIUM SERPL-SCNC: 3.9 MMOL/L — SIGNIFICANT CHANGE UP (ref 3.5–5)
PROT SERPL-MCNC: 5.2 G/DL — LOW (ref 6–8)
RBC # BLD: 3.87 M/UL — LOW (ref 4.2–5.4)
RBC # FLD: 13.2 % — SIGNIFICANT CHANGE UP (ref 11.5–14.5)
SODIUM SERPL-SCNC: 139 MMOL/L — SIGNIFICANT CHANGE UP (ref 135–146)
WBC # BLD: 6.11 K/UL — SIGNIFICANT CHANGE UP (ref 4.8–10.8)
WBC # FLD AUTO: 6.11 K/UL — SIGNIFICANT CHANGE UP (ref 4.8–10.8)

## 2023-06-21 PROCEDURE — S2900 ROBOTIC SURGICAL SYSTEM: CPT | Mod: NC

## 2023-06-21 PROCEDURE — 99232 SBSQ HOSP IP/OBS MODERATE 35: CPT | Mod: 57

## 2023-06-21 PROCEDURE — 88304 TISSUE EXAM BY PATHOLOGIST: CPT | Mod: 26

## 2023-06-21 PROCEDURE — 47562 LAPAROSCOPIC CHOLECYSTECTOMY: CPT

## 2023-06-21 RX ORDER — SODIUM CHLORIDE 9 MG/ML
1000 INJECTION, SOLUTION INTRAVENOUS
Refills: 0 | Status: DISCONTINUED | OUTPATIENT
Start: 2023-06-21 | End: 2023-06-21

## 2023-06-21 RX ORDER — SODIUM CHLORIDE 9 MG/ML
1000 INJECTION, SOLUTION INTRAVENOUS
Refills: 0 | Status: DISCONTINUED | OUTPATIENT
Start: 2023-06-21 | End: 2023-06-22

## 2023-06-21 RX ORDER — IBUPROFEN 200 MG
600 TABLET ORAL EVERY 8 HOURS
Refills: 0 | Status: DISCONTINUED | OUTPATIENT
Start: 2023-06-21 | End: 2023-06-22

## 2023-06-21 RX ORDER — ENOXAPARIN SODIUM 100 MG/ML
40 INJECTION SUBCUTANEOUS EVERY 24 HOURS
Refills: 0 | Status: DISCONTINUED | OUTPATIENT
Start: 2023-06-21 | End: 2023-06-22

## 2023-06-21 RX ORDER — ONDANSETRON 8 MG/1
4 TABLET, FILM COATED ORAL ONCE
Refills: 0 | Status: DISCONTINUED | OUTPATIENT
Start: 2023-06-21 | End: 2023-06-21

## 2023-06-21 RX ORDER — HYDROMORPHONE HYDROCHLORIDE 2 MG/ML
0.5 INJECTION INTRAMUSCULAR; INTRAVENOUS; SUBCUTANEOUS
Refills: 0 | Status: DISCONTINUED | OUTPATIENT
Start: 2023-06-21 | End: 2023-06-21

## 2023-06-21 RX ORDER — ONDANSETRON 8 MG/1
4 TABLET, FILM COATED ORAL EVERY 6 HOURS
Refills: 0 | Status: DISCONTINUED | OUTPATIENT
Start: 2023-06-21 | End: 2023-06-22

## 2023-06-21 RX ORDER — OXYCODONE HYDROCHLORIDE 5 MG/1
5 TABLET ORAL EVERY 4 HOURS
Refills: 0 | Status: DISCONTINUED | OUTPATIENT
Start: 2023-06-21 | End: 2023-06-22

## 2023-06-21 RX ORDER — GABAPENTIN 400 MG/1
300 CAPSULE ORAL EVERY 8 HOURS
Refills: 0 | Status: DISCONTINUED | OUTPATIENT
Start: 2023-06-21 | End: 2023-06-22

## 2023-06-21 RX ORDER — PIPERACILLIN AND TAZOBACTAM 4; .5 G/20ML; G/20ML
3.38 INJECTION, POWDER, LYOPHILIZED, FOR SOLUTION INTRAVENOUS EVERY 8 HOURS
Refills: 0 | Status: DISCONTINUED | OUTPATIENT
Start: 2023-06-21 | End: 2023-06-22

## 2023-06-21 RX ADMIN — Medication 600 MILLIGRAM(S): at 15:46

## 2023-06-21 RX ADMIN — GABAPENTIN 300 MILLIGRAM(S): 400 CAPSULE ORAL at 15:46

## 2023-06-21 RX ADMIN — PIPERACILLIN AND TAZOBACTAM 25 GRAM(S): 4; .5 INJECTION, POWDER, LYOPHILIZED, FOR SOLUTION INTRAVENOUS at 05:55

## 2023-06-21 RX ADMIN — HYDROMORPHONE HYDROCHLORIDE 0.5 MILLIGRAM(S): 2 INJECTION INTRAMUSCULAR; INTRAVENOUS; SUBCUTANEOUS at 13:58

## 2023-06-21 RX ADMIN — Medication 600 MILLIGRAM(S): at 15:54

## 2023-06-21 RX ADMIN — SODIUM CHLORIDE 100 MILLILITER(S): 9 INJECTION, SOLUTION INTRAVENOUS at 14:32

## 2023-06-21 RX ADMIN — PIPERACILLIN AND TAZOBACTAM 25 GRAM(S): 4; .5 INJECTION, POWDER, LYOPHILIZED, FOR SOLUTION INTRAVENOUS at 21:15

## 2023-06-21 RX ADMIN — PIPERACILLIN AND TAZOBACTAM 25 GRAM(S): 4; .5 INJECTION, POWDER, LYOPHILIZED, FOR SOLUTION INTRAVENOUS at 14:31

## 2023-06-21 RX ADMIN — OXYCODONE HYDROCHLORIDE 5 MILLIGRAM(S): 5 TABLET ORAL at 17:19

## 2023-06-21 RX ADMIN — HYDROMORPHONE HYDROCHLORIDE 0.5 MILLIGRAM(S): 2 INJECTION INTRAMUSCULAR; INTRAVENOUS; SUBCUTANEOUS at 13:47

## 2023-06-21 RX ADMIN — Medication 600 MILLIGRAM(S): at 22:17

## 2023-06-21 RX ADMIN — OXYCODONE HYDROCHLORIDE 5 MILLIGRAM(S): 5 TABLET ORAL at 23:00

## 2023-06-21 RX ADMIN — ONDANSETRON 4 MILLIGRAM(S): 8 TABLET, FILM COATED ORAL at 21:14

## 2023-06-21 RX ADMIN — OXYCODONE HYDROCHLORIDE 5 MILLIGRAM(S): 5 TABLET ORAL at 16:49

## 2023-06-21 RX ADMIN — SODIUM CHLORIDE 115 MILLILITER(S): 9 INJECTION, SOLUTION INTRAVENOUS at 04:54

## 2023-06-21 RX ADMIN — Medication 10 MILLIGRAM(S): at 05:55

## 2023-06-21 RX ADMIN — SODIUM CHLORIDE 75 MILLILITER(S): 9 INJECTION, SOLUTION INTRAVENOUS at 13:08

## 2023-06-21 RX ADMIN — HYDROMORPHONE HYDROCHLORIDE 0.5 MILLIGRAM(S): 2 INJECTION INTRAMUSCULAR; INTRAVENOUS; SUBCUTANEOUS at 14:51

## 2023-06-21 RX ADMIN — HYDROMORPHONE HYDROCHLORIDE 0.5 MILLIGRAM(S): 2 INJECTION INTRAMUSCULAR; INTRAVENOUS; SUBCUTANEOUS at 14:32

## 2023-06-21 RX ADMIN — ENOXAPARIN SODIUM 40 MILLIGRAM(S): 100 INJECTION SUBCUTANEOUS at 15:47

## 2023-06-21 RX ADMIN — OXYCODONE HYDROCHLORIDE 5 MILLIGRAM(S): 5 TABLET ORAL at 22:15

## 2023-06-21 RX ADMIN — HYDROMORPHONE HYDROCHLORIDE 0.5 MILLIGRAM(S): 2 INJECTION INTRAMUSCULAR; INTRAVENOUS; SUBCUTANEOUS at 15:12

## 2023-06-21 RX ADMIN — HYDROMORPHONE HYDROCHLORIDE 0.5 MILLIGRAM(S): 2 INJECTION INTRAMUSCULAR; INTRAVENOUS; SUBCUTANEOUS at 13:00

## 2023-06-21 RX ADMIN — GABAPENTIN 300 MILLIGRAM(S): 400 CAPSULE ORAL at 21:13

## 2023-06-21 RX ADMIN — ONDANSETRON 4 MILLIGRAM(S): 8 TABLET, FILM COATED ORAL at 13:01

## 2023-06-21 RX ADMIN — Medication 600 MILLIGRAM(S): at 21:13

## 2023-06-21 NOTE — PRE-ANESTHESIA EVALUATION ADULT - NSANTHOSAYNRD_GEN_A_CORE
denies/No. SWEETIE screening performed.  STOP BANG Legend: 0-2 = LOW Risk; 3-4 = INTERMEDIATE Risk; 5-8 = HIGH Risk
denies/No. SWEETIE screening performed.  STOP BANG Legend: 0-2 = LOW Risk; 3-4 = INTERMEDIATE Risk; 5-8 = HIGH Risk

## 2023-06-21 NOTE — PRE-OP CHECKLIST - ALLERGIES REVIEWED
Vaccine Information Statement(s) was given today. This has been reviewed, questions answered, and verbal consent given by Patient for injection(s) and administration of Influenza (Inactivated).      Patient tolerated without incident. See immunization grid for documentation.        
done
done

## 2023-06-21 NOTE — PROGRESS NOTE ADULT - SUBJECTIVE AND OBJECTIVE BOX
GENERAL SURGERY PROGRESS NOTE    Patient: DEANNA SCHRADER , 32y ([birth year])Female   MRN: [id number]  Location: [address] ([address]) 025 A  Visit: 06-19-23 Inpatient  Date: 06-21-23 @ 08:17    Events of past 24 hours:  -s/p ERCP  -started on IV Zosyn  -added on for lap joselito     PAST MEDICAL & SURGICAL HISTORY:  No pertinent past medical history  No significant past surgical history    Vitals:   T(F): 98.1 (06-21-23 @ 04:23), Max: 98.3 (06-20-23 @ 20:51)  HR: 75 (06-21-23 @ 04:23)  BP: 98/57 (06-21-23 @ 04:23)  RR: 18 (06-21-23 @ 04:23)  SpO2: 97% (06-21-23 @ 04:23)    Diet, NPO:   Except Medications     Special Instructions for Nursing:  Except Medications    PHYSICAL EXAM:  GENERAL: NAD, well-appearing  CHEST/LUNG: Clear to auscultation bilaterally  HEART: Regular rate and rhythm  ABDOMEN: Soft, mild ruq tenderness, Nondistended;   EXTREMITIES:  No clubbing, cyanosis, or edema    MEDICATIONS  (STANDING):  enoxaparin Injectable 40 milliGRAM(s) SubCutaneous every 24 hours  indomethacin Suppository 100 milliGRAM(s) Rectal once  lactated ringers. 1000 milliLiter(s) (115 mL/Hr) IV Continuous <Continuous>  metoclopramide 10 milliGRAM(s) Oral every 8 hours  piperacillin/tazobactam IVPB. 3.375 Gram(s) IV Intermittent once  piperacillin/tazobactam IVPB.. 3.375 Gram(s) IV Intermittent every 8 hours    MEDICATIONS  (PRN):  ondansetron Injectable 4 milliGRAM(s) IV Push every 6 hours PRN Nausea and/or Vomiting  oxyCODONE    IR 5 milliGRAM(s) Oral every 6 hours PRN Severe Pain (7 - 10)    DVT PROPHYLAXIS: enoxaparin Injectable 40 milliGRAM(s) SubCutaneous every 24 hours    GI PROPHYLAXIS:   ANTICOAGULATION:   ANTIBIOTICS:  piperacillin/tazobactam IVPB. 3.375 Gram(s)  piperacillin/tazobactam IVPB.. 3.375 Gram(s)    LAB/STUDIES:  Labs:  CAPILLARY BLOOD GLUCOSE                          12.7   4.49  )-----------( 238      ( 20 Jun 2023 20:37 )             38.5       Auto Neutrophil %: 84.5 % (06-20-23 @ 20:37)  Auto Immature Granulocyte %: 0.2 % (06-20-23 @ 20:37)    06-20    138  |  104  |  5<L>  ----------------------------<  109<H>  4.4   |  21  |  0.6<L>    Calcium: 8.6 mg/dL (06-20-23 @ 20:37)    LFTs:             6.0  | 5.3  | 337      ------------------[155     ( 20 Jun 2023 20:37 )  4.2  | 2.0  | 988         Lipase:x      Amylase:x        Coags:     13.20  ----< 1.15    ( 20 Jun 2023 05:16 )     35.8     Urinalysis Basic - ( 20 Jun 2023 20:37 )    Color: x / Appearance: x / SG: x / pH: x  Gluc: 109 mg/dL / Ketone: x  / Bili: x / Urobili: x   Blood: x / Protein: x / Nitrite: x   Leuk Esterase: x / RBC: x / WBC x   Sq Epi: x / Non Sq Epi: x / Bacteria: x

## 2023-06-21 NOTE — PROGRESS NOTE ADULT - SUBJECTIVE AND OBJECTIVE BOX
Gastroenterology progress note:     Patient is a 32y old  Female who presents with a chief complaint of Symptomatic Cholelithiasis, r/o CBD stone (21 Jun 2023 08:17)       Admitted on: 06-19-23    We are following the patient for elevated LFT      Interval History:  feeling much better after procedure yesterday   denies any abdominal pain, nausea, vomiting        PAST MEDICAL & SURGICAL HISTORY:  No pertinent past medical history      No significant past surgical history          MEDICATIONS  (STANDING):  enoxaparin Injectable 40 milliGRAM(s) SubCutaneous every 24 hours  gabapentin 300 milliGRAM(s) Oral every 8 hours  ibuprofen  Tablet. 600 milliGRAM(s) Oral every 8 hours  indomethacin Suppository 100 milliGRAM(s) Rectal once  lactated ringers. 1000 milliLiter(s) (100 mL/Hr) IV Continuous <Continuous>  lactated ringers. 1000 milliLiter(s) (75 mL/Hr) IV Continuous <Continuous>  piperacillin/tazobactam IVPB. 3.375 Gram(s) IV Intermittent once  piperacillin/tazobactam IVPB.. 3.375 Gram(s) IV Intermittent every 8 hours    MEDICATIONS  (PRN):  HYDROmorphone  Injectable 0.5 milliGRAM(s) IV Push every 10 minutes PRN Moderate Pain (4 - 6)  ondansetron Injectable 4 milliGRAM(s) IV Push every 6 hours PRN Nausea and/or Vomiting  ondansetron Injectable 4 milliGRAM(s) IV Push once PRN Nausea and/or Vomiting  oxyCODONE    IR 5 milliGRAM(s) Oral every 4 hours PRN Severe Pain (7 - 10)      Allergies  No Known Allergies      Review of Systems:   Cardiovascular:  No Chest Pain, No Palpitations  Respiratory:  No Cough, No Dyspnea  Gastrointestinal:  As described in HPI    Physical Examination:  T(C): 36.7 (06-21-23 @ 10:37), Max: 36.8 (06-20-23 @ 20:51)  HR: 77 (06-21-23 @ 10:37) (62 - 95)  BP: 116/67 (06-21-23 @ 10:37) (98/57 - 140/69)  RR: 18 (06-21-23 @ 10:37) (16 - 18)  SpO2: 99% (06-21-23 @ 10:37) (97% - 100%)  Weight (kg): 78.5 (06-21-23 @ 10:37)    Constitutional: No acute distress.  Respiratory:  No signs of respiratory distress. Lung sounds are clear bilaterally.  Cardiovascular:  S1 S2, Regular rate and rhythm.  Abdominal: Abdomen is soft, symmetric, and non-tender without distention.         Data:                        12.7   4.49  )-----------( 238      ( 20 Jun 2023 20:37 )             38.5     Hgb trend:  12.7  06-20-23 @ 20:37  12.0  06-19-23 @ 21:03  13.9  06-19-23 @ 10:40      06-20    138  |  104  |  5<L>  ----------------------------<  109<H>  4.4   |  21  |  0.6<L>    Ca    8.6      20 Jun 2023 20:37  Phos  3.4     06-20  Mg     1.9     06-20    TPro  6.0  /  Alb  4.2  /  TBili  5.3<H>  /  DBili  2.0<H>  /  AST  337<H>  /  ALT  988  /  AlkPhos  155<H>  06-20    Liver panel trend:  TBili 5.3   /      /      /   AlkP 155   /   Tptn 6.0   /   Alb 4.2    /   DBili 2.0      06-20  TBili 5.5   /      /   ALT 1151   /   AlkP 141   /   Tptn 5.3   /   Alb 3.6    /   DBili 1.9      06-20  TBili 4.9   /   AST 1107   /   ALT 1159   /   AlkP 133   /   Tptn 5.5   /   Alb 3.7    /   DBili 1.6      06-19  TBili 4.0   /   AST 1190   /      /   AlkP 122   /   Tptn 6.1   /   Alb 4.3    /   DBili 1.5      06-19  TBili 3.9   /   AST 1241   /      /   AlkP 112   /   Tptn 6.7   /   Alb 4.5    /   DBili --      06-19      PT/INR - ( 20 Jun 2023 05:16 )   PT: 13.20 sec;   INR: 1.15 ratio         PTT - ( 20 Jun 2023 05:16 )  PTT:35.8 sec       Radiology:      MR MRCP w/wo IV Cont:   ACC: [id number] EXAM:  MR MRCP WAW IC   ORDERED BY: MEET YUSUF     PROCEDURE DATE:  06/19/2023          INTERPRETATION:  CLINICAL INFORMATION: Right upper quadrant abdominal   pain.    COMPARISON: None.    CONTRAST/COMPLICATIONS:  IV Contrast: Gadavist  7 cc administered   3 cc discarded  Oral Contrast: NONE  Complications: None reported at time of study completion    PROCEDURE:  MRI of the abdomen was performed.  MRCP was performed.      FINDINGS:    LIVER: 2.5 x 1.8 cm outpouching off the left main portal vein which still   provides portal vein branches, possibly a varix. The hepatic and portal   veins are patent. No focal liver mass is identified.  BILE DUCTS: No intra or extrahepatic biliary ductal dilatation. CBD   measures 5 mm. No choledocholithiasis.  GALLBLADDER: Cholelithiasis and gallbladder sludge. Mild gallbladder wall   thickening. No significant pericholecystic fluid.  SPLEEN: Within normal limits.  PANCREAS: Within normal limits.  ADRENALS: Within normal limits.  KIDNEYS/URETERS: No hydronephrosis. Right upper pole subcentimeter renal   cyst.  PERITONEUM: No ascites.  RETROPERITONEUM/LYMPH NODES: No lymphadenopathy.  ABDOMINAL WALL: Fat-containing umbilical hernia, partially imaged  BONES: No acute osseous abnormality.  VESSELS: Normal caliber abdominal aorta      IMPRESSION:  1.  Cholelithiasis, gallbladder sludge and mild gallbladder wall   thickening without evidence for significant pericholecystic fluid/fat   stranding.  2.  No biliary ductal dilatation or choledocholithiasis.    --- End of Report ---          NELLIE AVENDANO MD; Resident Radiologist  This document has been electronically signed.  TATIANA BUTT MD; Attending Radiologist  This document has been electronically signed. Jun 20 2023 10:21AM (06-19-23 @ 22:40)

## 2023-06-21 NOTE — PROVIDER CONTACT NOTE (OTHER) - ASSESSMENT
pt c/o "pins/needles" sensation in b/l upper/ lower extremities and b/l cheeks; pt denies pain; vitals are stable; pt was ambulated around the unit and found some relief however still feels "pin/needle" sensation after ambulating

## 2023-06-21 NOTE — BRIEF OPERATIVE NOTE - NSICDXBRIEFPROCEDURE_GEN_ALL_CORE_FT
PROCEDURES:  Robot-assisted cholecystectomy 21-Jun-2023 12:51:38  Jorge A Robbins, transversus abdominis plane, bilateral 21-Jun-2023 12:51:43  Jorge A Robbins

## 2023-06-21 NOTE — BRIEF OPERATIVE NOTE - NSICDXBRIEFPOSTOP_GEN_ALL_CORE_FT
POST-OP DIAGNOSIS:  Acute cholecystitis 21-Jun-2023 12:52:10  Jorge A Robbins   POST-OP DIAGNOSIS:  Cholelithiasis with cholangitis 23-Jun-2023 09:45:01  Richard Hadley  Choledocholithiasis 23-Jun-2023 09:45:13  Richard Hadley

## 2023-06-22 ENCOUNTER — TRANSCRIPTION ENCOUNTER (OUTPATIENT)
Age: 33
End: 2023-06-22

## 2023-06-22 VITALS
HEART RATE: 67 BPM | TEMPERATURE: 96 F | DIASTOLIC BLOOD PRESSURE: 60 MMHG | RESPIRATION RATE: 18 BRPM | OXYGEN SATURATION: 97 % | SYSTOLIC BLOOD PRESSURE: 106 MMHG

## 2023-06-22 LAB
ALBUMIN SERPL ELPH-MCNC: 3.4 G/DL — LOW (ref 3.5–5.2)
ALP SERPL-CCNC: 104 U/L — SIGNIFICANT CHANGE UP (ref 30–115)
ALT FLD-CCNC: 457 U/L — HIGH (ref 0–41)
ANION GAP SERPL CALC-SCNC: 8 MMOL/L — SIGNIFICANT CHANGE UP (ref 7–14)
AST SERPL-CCNC: 111 U/L — HIGH (ref 0–41)
BASOPHILS # BLD AUTO: 0.02 K/UL — SIGNIFICANT CHANGE UP (ref 0–0.2)
BASOPHILS NFR BLD AUTO: 0.4 % — SIGNIFICANT CHANGE UP (ref 0–1)
BILIRUB SERPL-MCNC: 1.7 MG/DL — HIGH (ref 0.2–1.2)
BUN SERPL-MCNC: 6 MG/DL — LOW (ref 10–20)
CALCIUM SERPL-MCNC: 7.9 MG/DL — LOW (ref 8.4–10.5)
CHLORIDE SERPL-SCNC: 107 MMOL/L — SIGNIFICANT CHANGE UP (ref 98–110)
CO2 SERPL-SCNC: 25 MMOL/L — SIGNIFICANT CHANGE UP (ref 17–32)
CREAT SERPL-MCNC: 0.6 MG/DL — LOW (ref 0.7–1.5)
EGFR: 124 ML/MIN/1.73M2 — SIGNIFICANT CHANGE UP
EOSINOPHIL # BLD AUTO: 0.04 K/UL — SIGNIFICANT CHANGE UP (ref 0–0.7)
EOSINOPHIL NFR BLD AUTO: 0.9 % — SIGNIFICANT CHANGE UP (ref 0–8)
GLUCOSE SERPL-MCNC: 88 MG/DL — SIGNIFICANT CHANGE UP (ref 70–99)
HCT VFR BLD CALC: 32.6 % — LOW (ref 37–47)
HGB BLD-MCNC: 10.8 G/DL — LOW (ref 12–16)
IMM GRANULOCYTES NFR BLD AUTO: 0.2 % — SIGNIFICANT CHANGE UP (ref 0.1–0.3)
LYMPHOCYTES # BLD AUTO: 1.6 K/UL — SIGNIFICANT CHANGE UP (ref 1.2–3.4)
LYMPHOCYTES # BLD AUTO: 34.6 % — SIGNIFICANT CHANGE UP (ref 20.5–51.1)
MAGNESIUM SERPL-MCNC: 2.2 MG/DL — SIGNIFICANT CHANGE UP (ref 1.8–2.4)
MCHC RBC-ENTMCNC: 28.9 PG — SIGNIFICANT CHANGE UP (ref 27–31)
MCHC RBC-ENTMCNC: 33.1 G/DL — SIGNIFICANT CHANGE UP (ref 32–37)
MCV RBC AUTO: 87.2 FL — SIGNIFICANT CHANGE UP (ref 81–99)
MONOCYTES # BLD AUTO: 0.48 K/UL — SIGNIFICANT CHANGE UP (ref 0.1–0.6)
MONOCYTES NFR BLD AUTO: 10.4 % — HIGH (ref 1.7–9.3)
NEUTROPHILS # BLD AUTO: 2.47 K/UL — SIGNIFICANT CHANGE UP (ref 1.4–6.5)
NEUTROPHILS NFR BLD AUTO: 53.5 % — SIGNIFICANT CHANGE UP (ref 42.2–75.2)
NRBC # BLD: 0 /100 WBCS — SIGNIFICANT CHANGE UP (ref 0–0)
PHOSPHATE SERPL-MCNC: 4.5 MG/DL — SIGNIFICANT CHANGE UP (ref 2.1–4.9)
PLATELET # BLD AUTO: 218 K/UL — SIGNIFICANT CHANGE UP (ref 130–400)
PMV BLD: 11.3 FL — HIGH (ref 7.4–10.4)
POTASSIUM SERPL-MCNC: 4 MMOL/L — SIGNIFICANT CHANGE UP (ref 3.5–5)
POTASSIUM SERPL-SCNC: 4 MMOL/L — SIGNIFICANT CHANGE UP (ref 3.5–5)
PROT SERPL-MCNC: 5 G/DL — LOW (ref 6–8)
RBC # BLD: 3.74 M/UL — LOW (ref 4.2–5.4)
RBC # FLD: 13.3 % — SIGNIFICANT CHANGE UP (ref 11.5–14.5)
SODIUM SERPL-SCNC: 140 MMOL/L — SIGNIFICANT CHANGE UP (ref 135–146)
SURGICAL PATHOLOGY STUDY: SIGNIFICANT CHANGE UP
SURGICAL PATHOLOGY STUDY: SIGNIFICANT CHANGE UP
WBC # BLD: 4.62 K/UL — LOW (ref 4.8–10.8)
WBC # FLD AUTO: 4.62 K/UL — LOW (ref 4.8–10.8)

## 2023-06-22 RX ORDER — MAGNESIUM SULFATE 500 MG/ML
2 VIAL (ML) INJECTION ONCE
Refills: 0 | Status: COMPLETED | OUTPATIENT
Start: 2023-06-22 | End: 2023-06-22

## 2023-06-22 RX ORDER — POTASSIUM PHOSPHATE, MONOBASIC POTASSIUM PHOSPHATE, DIBASIC 236; 224 MG/ML; MG/ML
30 INJECTION, SOLUTION INTRAVENOUS ONCE
Refills: 0 | Status: COMPLETED | OUTPATIENT
Start: 2023-06-22 | End: 2023-06-22

## 2023-06-22 RX ORDER — PROCHLORPERAZINE MALEATE 5 MG
5 TABLET ORAL ONCE
Refills: 0 | Status: COMPLETED | OUTPATIENT
Start: 2023-06-22 | End: 2023-06-22

## 2023-06-22 RX ADMIN — PIPERACILLIN AND TAZOBACTAM 25 GRAM(S): 4; .5 INJECTION, POWDER, LYOPHILIZED, FOR SOLUTION INTRAVENOUS at 05:02

## 2023-06-22 RX ADMIN — Medication 600 MILLIGRAM(S): at 13:33

## 2023-06-22 RX ADMIN — GABAPENTIN 300 MILLIGRAM(S): 400 CAPSULE ORAL at 13:06

## 2023-06-22 RX ADMIN — OXYCODONE HYDROCHLORIDE 5 MILLIGRAM(S): 5 TABLET ORAL at 10:08

## 2023-06-22 RX ADMIN — Medication 25 GRAM(S): at 02:33

## 2023-06-22 RX ADMIN — Medication 5 MILLIGRAM(S): at 02:02

## 2023-06-22 RX ADMIN — Medication 600 MILLIGRAM(S): at 13:06

## 2023-06-22 RX ADMIN — POTASSIUM PHOSPHATE, MONOBASIC POTASSIUM PHOSPHATE, DIBASIC 83.33 MILLIMOLE(S): 236; 224 INJECTION, SOLUTION INTRAVENOUS at 00:43

## 2023-06-22 RX ADMIN — OXYCODONE HYDROCHLORIDE 5 MILLIGRAM(S): 5 TABLET ORAL at 10:38

## 2023-06-22 RX ADMIN — GABAPENTIN 300 MILLIGRAM(S): 400 CAPSULE ORAL at 05:02

## 2023-06-22 RX ADMIN — Medication 600 MILLIGRAM(S): at 05:02

## 2023-06-22 NOTE — DISCHARGE NOTE NURSING/CASE MANAGEMENT/SOCIAL WORK - NSDCVIVACCINE_GEN_ALL_CORE_FT
influenza, injectable, quadrivalent, preservative free; 27-Nov-2021 16:50; Keturah Barrera); [employer]; [id number] (Exp. Date: 30-Jun-2022); IntraMuscular; Deltoid Left.; 0.5 milliLiter(s); VIS (VIS Published: 06-Aug-2021, VIS Presented: 27-Nov-2021);   MMR; 23-Jul-2020 17:36; Yessenia Reed); [employer] [id number] (Exp. Date: 13-Aug-2021); SubCutaneous; Arm Left; 0.5 milliLiter(s); VIS (VIS Published: 20-Apr-2012, VIS Presented: 23-Jul-2020);

## 2023-06-22 NOTE — DISCHARGE NOTE PROVIDER - NSDCFUADDAPPT_GEN_ALL_CORE_FT
Please call providers listed to make appointments for 1-2 weeks from discharge date.  Please call providers listed to make appointments for 1-2 weeks from discharge date.     **Please make sure to call and follow-up with Dr. Zaid melendez as they will need to remove your stents they placed in about 4-6 weeks from date of discharge.

## 2023-06-22 NOTE — DISCHARGE NOTE PROVIDER - NSDCCPCAREPLAN_GEN_ALL_CORE_FT
PRINCIPAL DISCHARGE DIAGNOSIS  Diagnosis: Acute cholecystitis  Assessment and Plan of Treatment: You were diagnosed with a gallbladder infection/inflammatory condition called cholecystitis. You underwent a procedure utliazing surgical robot to minimally invasively remove the inflammed gallbladder.   You tolerated the procedure well and post operatiely, vital signs were stable, labs improving, and you were tolerating your regular low fat diet.   Recommendation is to continue low fat diet at home until follow-up where you would recieve further ibnformation regarding future diet.   Please shower as normal, avoid heavy lifting or straining for a 3-4 weeks.  Please take over the counter pain medications for mild pain. Please  augmentin antibiotic prescription at pharmacy.   If experiencing new onset fevers, chills, night sweats, drainage from incision sites, please report to [facility] emergency department for evaluation.  Lastly, please follow-up with care providers listed, including Dr. Hadley.      SECONDARY DISCHARGE DIAGNOSES  Diagnosis: Transaminitis  Assessment and Plan of Treatment:

## 2023-06-22 NOTE — PROGRESS NOTE ADULT - ASSESSMENT
31 y/o female with PMH of Normal Vaginal Delivery x2 who presented to the ED complaining of right upper quadrant abdominal pain with associated nausea and vomiting worsening over the past 1 day.  Elevated T bili to 4.0 and markedly elevated transaminitis suspicious for symptomatic cholelithiasis, possible Choledocholithiasis or hepatitis.    Plan:  -NPO, IVF  -Pain control, nausea control  -DVT ppx  -IV Zosyn   -s/p ERCP w/ stent placement  -Added on for lap joselito today     [id number]
33 y/o female with PMH of Normal Vaginal Delivery x2 who presented to the ED complaining of right upper quadrant abdominal pain with associated nausea and vomiting worsening over the past 1 day.  Elevated T bili to 4.0 and markedly elevated transaminitis suspicious for symptomatic cholelithiasis, possible Choledocholithiasis or hepatitis.    Plan:  -NPO, IVF  -Pain control, nausea control  -DVT ppx  -No antibiotics at this time unless patient becomes febrile with worsening WBC  -Advanced GI consult, possible ERCP  -MRCP Prelim shows cholelithiasis  -Acute Hepatitis Panel  -Plan for likely cholecystectomy this admission, +/- IOC 
32-year-old female with no prior past medical history nor past surgical history who presents to [facility] with complaints of abdominal pain.  Patient notes the pain started over the last 24 hours, located primarily in the right upper quadrant, sharp at times, 10 out of 10 at its worst, without alleviating factors, and associated with nausea and vomiting. Patient has notable elevations of liver function study with a total bilirubin of 4.0, as well as an ultrasound which is notable for an intact gallbladder with stones and some slight edema of the wall.    #)Cholelithiasis / Cholecystitis/ CBD stone   -MRCP negative for choledocholithiasis   -s/p EUS and ERCP 6/20 noted to have severely inflammed gall bladder with gall bladder wall thickening and dilated CBD with possible CBD stones and sludge   -s/p ERCP; Papillary stenosis, difficult cannulation pre cut sphincterotomy followed by CBD cannulation. Cholangiogram showed multiple small filling defects. Ballon sweeps were performed with removal of multiple small stones, thick black colored bile and sludge. 10 Fr x 7 cm plastic CBD stent placement with adequate drainage   -Hemodynamically stable     Recs:   diet as per surgery   Monitor for post ERCP complications   trend CBC, LFT   planned for surgery today for cholecystectomy   Repeat ERCP for stent removal in 4-6 weeks 
31 y/o female with PMH of Normal Vaginal Delivery x2 who presented to the ED complaining of right upper quadrant abdominal pain with associated nausea and vomiting worsening over 1 day.  Elevated T bili to 4.0 and markedly elevated transaminitis suspicious for symptomatic cholelithiasis, possible Choledocholithiasis or hepatitis.    Now s/p ERCP & laparoscopic cholecystectomy     Plan:  -Tolerating regular diet post operatively   -Hemodynamically stable, pain well controlled   -Plan for D/C today on PO Augmentin for a total of 7 days     [id number]

## 2023-06-22 NOTE — PROGRESS NOTE ADULT - SUBJECTIVE AND OBJECTIVE BOX
GENERAL SURGERY PROGRESS NOTE    Patient: DEANNA SCHRADER , 32y ([birth year])Female   MRN: [id number]  Location: [address] ([address]) [address]  Visit: 06-19-23 Inpatient  Date: 06-22-23 @ 09:54    PAST MEDICAL & SURGICAL HISTORY:  No pertinent past medical history  No significant past surgical history    Vitals:   T(F): 96.4 (06-22-23 @ 08:33), Max: 99.4 (06-21-23 @ 14:30)  HR: 67 (06-22-23 @ 08:33)  BP: 106/60 (06-22-23 @ 08:33)  RR: 18 (06-22-23 @ 08:33)  SpO2: 97% (06-22-23 @ 08:33)    Diet, Regular:   Low Fat (LOWFAT)    Fluids: lactated ringers.: Solution, 1000 milliLiter(s) infuse at 100 mL/Hr    I & O's:    06-21-23 @ 07:01  -  06-22-23 @ 07:00  --------------------------------------------------------  IN:  Total IN: 0 mL    OUT:    Voided (mL): 250 mL  Total OUT: 250 mL    Total NET: -250 mL    PHYSICAL EXAM:  General: NAD, AAOx3, calm and cooperative  Cardiac: RRR S1, S2  Respiratory: Normal respiratory effort  Abdomen: Soft, non-distended, appropriately tender at incision sites   Skin: Warm/dry, normal color, no jaundice  Incision/wound: healing well, dressings in place, clean, dry and intact    MEDICATIONS  (STANDING):  enoxaparin Injectable 40 milliGRAM(s) SubCutaneous every 24 hours  gabapentin 300 milliGRAM(s) Oral every 8 hours  ibuprofen  Tablet. 600 milliGRAM(s) Oral every 8 hours  indomethacin Suppository 100 milliGRAM(s) Rectal once  lactated ringers. 1000 milliLiter(s) (100 mL/Hr) IV Continuous <Continuous>  piperacillin/tazobactam IVPB. 3.375 Gram(s) IV Intermittent once  piperacillin/tazobactam IVPB.. 3.375 Gram(s) IV Intermittent every 8 hours    MEDICATIONS  (PRN):  ondansetron Injectable 4 milliGRAM(s) IV Push every 6 hours PRN Nausea and/or Vomiting  oxyCODONE    IR 5 milliGRAM(s) Oral every 4 hours PRN Severe Pain (7 - 10)    DVT PROPHYLAXIS: enoxaparin Injectable 40 milliGRAM(s) SubCutaneous every 24 hours    GI PROPHYLAXIS:   ANTICOAGULATION:   ANTIBIOTICS:  piperacillin/tazobactam IVPB. 3.375 Gram(s)  piperacillin/tazobactam IVPB.. 3.375 Gram(s)    LAB/STUDIES:  Labs:  CAPILLARY BLOOD GLUCOSE               10.8   4.62  )-----------( 218      ( 22 Jun 2023 05:41 )             32.6       Auto Neutrophil %: 53.5 % (06-22-23 @ 05:41)  Auto Immature Granulocyte %: 0.2 % (06-22-23 @ 05:41)  Auto Neutrophil %: 72.6 % (06-21-23 @ 22:37)  Auto Immature Granulocyte %: 0.3 % (06-21-23 @ 22:37)    06-22    140  |  107  |  6<L>  ----------------------------<  88  4.0   |  25  |  0.6<L>    Calcium: 7.9 mg/dL (06-22-23 @ 05:41)    LFTs:             5.0  | 1.7  | 111      ------------------[104     ( 22 Jun 2023 05:41 )  3.4  | x    | 457         Lipase:x      Amylase:x        Coags:    Urinalysis Basic - ( 22 Jun 2023 05:41 )    Color: x / Appearance: x / SG: x / pH: x  Gluc: 88 mg/dL / Ketone: x  / Bili: x / Urobili: x   Blood: x / Protein: x / Nitrite: x   Leuk Esterase: x / RBC: x / WBC x   Sq Epi: x / Non Sq Epi: x / Bacteria: x

## 2023-06-22 NOTE — DISCHARGE NOTE NURSING/CASE MANAGEMENT/SOCIAL WORK - PATIENT PORTAL LINK FT
You can access the FollowMyHealth Patient Portal offered by [facility] by registering at the following website: [email or web address]. By joining [facility]’s FollowMyHealth portal, you will also be able to view your health information using other applications (apps) compatible with our system.

## 2023-06-22 NOTE — DISCHARGE NOTE PROVIDER - HOSPITAL COURSE
HPI:  Patient is a 33 y/o female with PMH of Normal Vaginal Delivery x2 who presented to the ED complaining of right upper quadrant abdominal pain with associated nausea and vomiting worsening over the past 1 day. The patient reports that she has experienced a similar pain to this before about one month ago but did not seek any medical evaluation. The patient reported that day before presenting to the ED, she had 5 episodes of NBNB vomiting. The patient reported that she has never had a colonoscopy and never had an EGD before in the past. The patient reports that her last BM was day before presenting to the ED and that it is been more pale in color and oily lately, and floats in the toilet bowl. Notably, the patient reports that several of the women on her father's side of the family have had their gallbladder's removed.     GI:  On hospital day #1, patient underwent EUS, ERCP, now s/p EUS noted to have severely inflamed gall bladder with gall bladder wall thickening and dilated CBD with possible CBD stones and sludge   -s/p ERCP; Papillary stenosis, difficult cannulation pre cut sphincterotomy followed by CBD cannulation. Cholangiogram showed multiple small filling defects. Ballon sweeps were performed with removal of multiple small stones, thick black colored bile and sludge. 10 Fr x 7 cm plastic CBD stent placement with adequate drainage     Surgery:   On hospital day # 2, Patient underwent uncomplicated robotic assisted laparoscopic cholecystectomy. Intra-operatively, dilated, edematous gallbladder with stones was noted. EBL was 5ml.     Post operatively, patient is tolerating her regular diet, Hemodynamically stable, pain well controlled. Plan for D/C today on PO Augmentin for a total of 7 days, 4 days outpatient.          HPI:  Patient is a 33 y/o female with PMH of Normal Vaginal Delivery x2 who presented to the ED complaining of right upper quadrant abdominal pain with associated nausea and vomiting worsening over the past 1 day. The patient reports that she has experienced a similar pain to this before about one month ago but did not seek any medical evaluation. The patient reported that day before presenting to the ED, she had 5 episodes of NBNB vomiting. The patient reported that she has never had a colonoscopy and never had an EGD before in the past. The patient reports that her last BM was day before presenting to the ED and that it is been more pale in color and oily lately, and floats in the toilet bowl. Notably, the patient reports that several of the women on her father's side of the family have had their gallbladder's removed.     GI:  On hospital day #1, patient underwent EUS, ERCP, now s/p EUS noted to have severely inflamed gall bladder with gall bladder wall thickening and dilated CBD with possible CBD stones and sludge   -s/p ERCP; Papillary stenosis, difficult cannulation pre cut sphincterotomy followed by CBD cannulation. Cholangiogram showed multiple small filling defects. Ballon sweeps were performed with removal of multiple small stones, thick black colored bile and sludge. 10 Fr x 7 cm plastic CBD stent placement with adequate drainage     Surgery:   On hospital day # 2, Patient underwent uncomplicated robotic assisted laparoscopic cholecystectomy. Intra-operatively, dilated, edematous gallbladder with stones was noted. EBL was 5ml.     Post operatively, patient is tolerating her regular diet, Hemodynamically stable, pain well controlled., LFTs downtrending. Plan for D/C today on PO Augmentin for a total of 7 days, 4 days outpatient.

## 2023-06-22 NOTE — DISCHARGE NOTE NURSING/CASE MANAGEMENT/SOCIAL WORK - NSDCFUADDAPPT_GEN_ALL_CORE_FT
Please call providers listed to make appointments for 1-2 weeks from discharge date.     **Please make sure to call and follow-up with Dr. Zaid melendez as they will need to remove your stents they placed in about 4-6 weeks from date of discharge.

## 2023-06-22 NOTE — DISCHARGE NOTE NURSING/CASE MANAGEMENT/SOCIAL WORK - NSDCPEFALRISK_GEN_ALL_CORE
For information on Fall & Injury Prevention, visit: [email or web address] OR  [email or web address] OR  https://www.cdc.gov/steadi/patient.html

## 2023-06-22 NOTE — DISCHARGE NOTE PROVIDER - CARE PROVIDER_API CALL
Richard Hadley  Surgery  [address] [place] [address], [facility] 3rd [place] [address]  Phone: [phone]  Fax: [phone]  Follow Up Time: 2 weeks    Tan Evans  Gastroenterology  [address] [place] [address]  Phone: [phone]  Fax: [phone]  Follow Up Time: 2 weeks

## 2023-06-22 NOTE — DISCHARGE NOTE PROVIDER - PROVIDER TOKENS
PROVIDER:[TOKEN:[[id number]],FOLLOWUP:[2 weeks]],PROVIDER:[TOKEN:[[id number]],FOLLOWUP:[2 weeks]]

## 2023-06-22 NOTE — PROGRESS NOTE ADULT - ATTENDING COMMENTS
as above. now s/p ERCP with sphincterotomy and removal of stones and sludge as well as pus, plastic stent placed; likely early cholangitis; based on this, cholecystectomy is indicated; risks and benefits were explained to the patient in detail and informed consent was signed.     c/w zosyn per GI recs  plan for OR today
POD#1 s/p robotic cholecystectomy for choledocholithiasis, early cholangitis; also POD#2 s/p ERCP with sphincterotomy and removal of stones/sludge/pus, placement of plastic stent    pain improved. nausea improved. LFTs downtrending; tolerating diet. d/c home.

## 2023-06-22 NOTE — PROGRESS NOTE ADULT - REASON FOR ADMISSION
Symptomatic Cholelithiasis, r/o CBD stone

## 2023-07-01 NOTE — CDI QUERY NOTE - NSCDIOTHERTXTBX_GEN_ALL_CORE_HH
Based on your professional judgment and the clinical indicators provided below, please clarify if markedly elevated transaminitis can be further specified as:    •  Markedly elevated transaminitis suspicious for symptomatic cholelithiasis with acute non-viral hepatitis was down trending at the time of discharge.    •  Markedly elevated transaminitis suspicious for symptomatic cholelithiasis without acute non-viral hepatitis was down trending at the time of discharge.    •  Other (please specify):    •  Clinically unable to determine      CLINICAL INDICATORS    6/19 Consult Note Adult-Gastroenterology PA: … Patient has notable elevations of liver function study with a total bilirubin of 4.0, as well as an ultrasound which is notable for an intact gallbladder with stones and some slight edema of the wall … Acute Hepatitis panel for now- denies toxic habits, herbal supplementation or exotic foods/ shellfish …     6/21 Operative Report: … presented with right upper quadrant pain and elevated LFTs who was admitted to the surgical service. Initially, her bilirubin was 44.0 and her AST and ALT were over 1000. After admission, her bilirubin continued to uptrend despite supportive care while her transaminases down-trended. Despite a negative MRCP, there was a high index of suspicion for choledocholithiasis and so the advance GI team took her for an EUS and subsequent ERCP where inflammation of the gallbladder was discovered as well as choledocholithiasis, common bile duct sludge and pus concerning for early cholangitis ...    6/22 Progress Note Adult-Surgery Resident/Attending: … presented to the ED complaining of right upper quadrant abdominal pain with associated nausea and vomiting worsening over 1 day.  Elevated T bili to 4.0 and markedly elevated transaminitis suspicious for symptomatic cholelithiasis, possible Choledocholithiasis or hepatitis. Now s/p ERCP & laparoscopic cholecystectomy … Attending Attestation: … POD#1 s/p robotic cholecystectomy for choledocholithiasis, early cholangitis; also POD#2 s/p ERCP with sphincterotomy and removal of stones/sludge/pus, placement of plastic stent … LFTs down trending; tolerating diet. d/c home.    Discharge Note: … On hospital day # 2, Patient underwent uncomplicated robotic assisted laparoscopic cholecystectomy. Intra-operatively, dilated, edematous gallbladder with stones was noted. EBL was 5ml. Post operatively, patient is tolerating her regular diet, hemodynamically stable, pain well controlled., LFTs down trending. Plan for D/C today on PO Augmentin for a total of 7 days, 4 days outpatient …         Thank you,  Lily Spencer RN, CCS, CCDS  [phone]

## 2023-07-12 ENCOUNTER — APPOINTMENT (OUTPATIENT)
Dept: GASTROENTEROLOGY | Facility: CLINIC | Age: 33
End: 2023-07-12
Payer: COMMERCIAL

## 2023-07-12 PROCEDURE — 99443: CPT

## 2023-07-12 NOTE — ASSESSMENT
[FreeTextEntry1] : Patient is a relatively healthy 32-year-old female with no prior past medical history nor past surgical history who presents to [facility] with complaints of abdominal pain.  Patient had ERCP with removal of stones and stent placement followed by [facility]. She is feeling much better. \par \par Setup for ERCP to remove stent

## 2023-07-12 NOTE — HISTORY OF PRESENT ILLNESS
[FreeTextEntry1] : Patient is a relatively healthy 32-year-old female with no prior past medical history nor past surgical history who presents to [facility] with complaints of abdominal pain.  Patient had ERCP with removal of stones and stent placement followed by [facility]. She is feeling much better.

## 2023-07-28 ENCOUNTER — TRANSCRIPTION ENCOUNTER (OUTPATIENT)
Age: 33
End: 2023-07-28

## 2023-07-28 ENCOUNTER — OUTPATIENT (OUTPATIENT)
Dept: INPATIENT UNIT | Facility: HOSPITAL | Age: 33
LOS: 1 days | Discharge: HOME | End: 2023-07-28
Payer: COMMERCIAL

## 2023-07-28 VITALS
SYSTOLIC BLOOD PRESSURE: 93 MMHG | RESPIRATION RATE: 18 BRPM | OXYGEN SATURATION: 99 % | DIASTOLIC BLOOD PRESSURE: 56 MMHG | HEART RATE: 57 BPM

## 2023-07-28 VITALS
HEART RATE: 84 BPM | RESPIRATION RATE: 18 BRPM | DIASTOLIC BLOOD PRESSURE: 74 MMHG | SYSTOLIC BLOOD PRESSURE: 110 MMHG | WEIGHT: 171.08 LBS | TEMPERATURE: 98 F | HEIGHT: 66 IN

## 2023-07-28 DIAGNOSIS — K80.50 CALCULUS OF BILE DUCT WITHOUT CHOLANGITIS OR CHOLECYSTITIS WITHOUT OBSTRUCTION: ICD-10-CM

## 2023-07-28 DIAGNOSIS — K80.20 CALCULUS OF GALLBLADDER WITHOUT CHOLECYSTITIS WITHOUT OBSTRUCTION: ICD-10-CM

## 2023-07-28 DIAGNOSIS — Z90.49 ACQUIRED ABSENCE OF OTHER SPECIFIED PARTS OF DIGESTIVE TRACT: Chronic | ICD-10-CM

## 2023-07-28 PROCEDURE — 81025 URINE PREGNANCY TEST: CPT

## 2023-07-28 PROCEDURE — C1769: CPT

## 2023-07-28 PROCEDURE — C1889: CPT

## 2023-07-28 PROCEDURE — 43264 ERCP REMOVE DUCT CALCULI: CPT | Mod: XU

## 2023-07-28 PROCEDURE — 43275 ERCP REMOVE FORGN BODY DUCT: CPT

## 2023-07-28 PROCEDURE — 74019 RADEX ABDOMEN 2 VIEWS: CPT

## 2023-07-28 PROCEDURE — 74328 X-RAY BILE DUCT ENDOSCOPY: CPT | Mod: 26

## 2023-07-28 NOTE — PRE-ANESTHESIA EVALUATION ADULT - NSANTHOSAYNRD_GEN_A_CORE
denies/No. SWEETIE screening performed.  STOP BANG Legend: 0-2 = LOW Risk; 3-4 = INTERMEDIATE Risk; 5-8 = HIGH Risk

## 2023-07-28 NOTE — ASU DISCHARGE PLAN (ADULT/PEDIATRIC) - CARE PROVIDER_API CALL
Gene Davila  Gastroenterology  [address] [place] [address]  Phone: [phone]  Fax: [phone]  Follow Up Time:

## 2023-07-28 NOTE — ASU DISCHARGE PLAN (ADULT/PEDIATRIC) - NS MD DC FALL RISK RISK
For information on Fall & Injury Prevention, visit: [email or web address] OR  [email or web address] OR  https://www.cdc.gov/steadi/patient.html

## 2023-07-28 NOTE — CHART NOTE - NSCHARTNOTEFT_GEN_A_CORE
PACU ANESTHESIA PACU ADMISSION NOTE      Procedure:  Post op diagnosis    ____ Intubated  TV:______       Rate: ______      FiO2: ______    __x__ Patent Airway    ____ Full return of protective reflexes    ____ Full recovery from anesthesia / sedation to baseline status    Viitals:  see anesthesia record            Mental Status:  ____ Awake   _x____ Alert   _____ Drowsy   _____ Sedated    Nausea/Vomiting: ____ Yes, See Post - Op Orders      ____x No    Pain Scale (0-10): _____    Treatment: ____ None    __x__ See Post - Op/PCA Orders    Post - Operative Fluids:   ____ Oral   ___x_ See Post - Op Orders    Plan:         Discharge:   __x__Home       _____Floor         _____Critical Care    _____Other:_________________    Comments: uneventful perioperative course; no s/s of anesthesia complications noted

## 2023-08-02 DIAGNOSIS — Z46.59 ENCOUNTER FOR FITTING AND ADJUSTMENT OF OTHER GASTROINTESTINAL APPLIANCE AND DEVICE: ICD-10-CM

## 2023-08-02 DIAGNOSIS — Z90.49 ACQUIRED ABSENCE OF OTHER SPECIFIED PARTS OF DIGESTIVE TRACT: ICD-10-CM

## 2023-08-02 DIAGNOSIS — K80.50 CALCULUS OF BILE DUCT WITHOUT CHOLANGITIS OR CHOLECYSTITIS WITHOUT OBSTRUCTION: ICD-10-CM

## 2023-08-08 NOTE — PRE-ANESTHESIA EVALUATION ADULT - WEIGHT IN KG
I found no evidence of dysfunction or anatomical abnormality of the visual system. He has noted the problem after using a computer for an extended period of time and may be getting ccrneal drying as a source of visual change. No additional testing is indicated from my perspective He will return to me as needed..     
78.5
78.5

## 2023-08-09 ENCOUNTER — APPOINTMENT (OUTPATIENT)
Dept: GASTROENTEROLOGY | Facility: CLINIC | Age: 33
End: 2023-08-09
Payer: COMMERCIAL

## 2023-08-09 DIAGNOSIS — Z78.9 OTHER SPECIFIED HEALTH STATUS: ICD-10-CM

## 2023-08-09 DIAGNOSIS — Z80.51 FAMILY HISTORY OF MALIGNANT NEOPLASM OF KIDNEY: ICD-10-CM

## 2023-08-09 DIAGNOSIS — K80.20 CALCULUS OF GALLBLADDER W/OUT CHOLECYSTITIS W/OUT OBSTRUCTION: ICD-10-CM

## 2023-08-09 DIAGNOSIS — K80.50 CALCULUS OF BILE DUCT W/OUT CHOLANGITIS OR CHOLECYSTITIS W/OUT OBSTRUCTION: ICD-10-CM

## 2023-08-09 PROCEDURE — 99213 OFFICE O/P EST LOW 20 MIN: CPT | Mod: 95

## 2023-08-09 RX ORDER — CHLORHEXIDINE GLUCONATE 4 %
LIQUID (ML) TOPICAL
Refills: 0 | Status: ACTIVE | COMMUNITY
Start: 2023-08-09

## 2023-08-15 NOTE — DISCHARGE NOTE OB - FLU SEASON?
08-14-23 @ 07:01  -  08-15-23 @ 07:00  --------------------------------------------------------  OUT:    Stool (mL): 1 mL  Total OUT: 1 mL    Total NET: -1 mL       Yes...

## 2023-08-30 NOTE — REASON FOR VISIT
[Home] : at home, [unfilled] , at the time of the visit. [Medical Office: ([facility])___] : at the medical office located in  [Patient] : the patient [Self] : self [This encounter was initiated by telehealth (audio with video) and converted to telephone (audio only) due to technical difficulties.] : This encounter was initiated by telehealth (audio with video) and converted to telephone (audio only) due to technical difficulties. [Post-op/Procedure: _________] : a [unfilled] post-op/procedure visit [FreeTextEntry4] : Karma

## 2023-08-30 NOTE — HISTORY OF PRESENT ILLNESS
[FreeTextEntry1] : 32 yr old female with hospitalization in June with abdominal pain. She was found to have gallstones with choledocholithiasis. ERCP was done with stent placement and removal of stones from the CBD. She is S/P repeat ERCP on 7/78/23 with removal of the stent and sludge from the CBD. She has a Lap Carissa done on 6/22/23 and has been doing well. She had no GI complaints. [de-identified] : 7/28/23

## 2023-08-30 NOTE — ASSESSMENT
[FreeTextEntry1] : 32 yr old female with hospitalization in June with abdominal pain. She was found to have gallstones with choledocholithiasis. ERCP was done with stent placement and removal of stones from the CBD. She is S/P repeat ERCP on 7/78/23 with removal of the stent and sludge from the CBD. She has a Lap Joselito done on 6/22/23 and has been doing well. She had no GI complaints.  S/P Cholelithiasis with Choledocholithiasis, with Lap joselito 6/22/23  - S/P ERCP with stent removal; she did not have any problems post procedure - Doing well now - F/U prn; RTC office sooner if LFTs increase

## 2023-09-07 NOTE — OB PROVIDER H&P - NSCHILDCOND1_OBGYN_ALL_OB
Living Thalidomide Counseling: I discussed with the patient the risks of thalidomide including but not limited to birth defects, anxiety, weakness, chest pain, dizziness, cough and severe allergy.

## 2023-11-22 NOTE — ED PROVIDER NOTE - NS ED MD DISPO ADMITTING SERVICE
Vital Signs Last 24 Hrs  T(C): 36.7 (22 Nov 2023 07:43), Max: 37.2 (22 Nov 2023 03:05)  T(F): 98.1 (22 Nov 2023 07:43), Max: 99 (22 Nov 2023 03:05)  HR: 82 (22 Nov 2023 07:28) (82 - 99)  BP: 121/66 (22 Nov 2023 07:28) (112/62 - 133/75)  BP(mean): --  RR: 20 (22 Nov 2023 07:43) (15 - 20)  SpO2: 95% (22 Nov 2023 07:43) (91% - 98%) SURG

## 2024-12-13 NOTE — OB RN PATIENT PROFILE - MENTAL HEALTH CONDITIONS/SYMPTOMS, PROFILE
SW made a homecare referral for in house  PhysicalTherapy at [facility] per patient request. Referral sent to fax number [phone].    Connie FINN, [facility]  Inpatient Care Coordination  Emergency Room   [phone]    none

## 2025-03-20 ENCOUNTER — APPOINTMENT (OUTPATIENT)
Dept: OBGYN | Facility: CLINIC | Age: 35
End: 2025-03-20
Payer: COMMERCIAL

## 2025-03-20 VITALS
SYSTOLIC BLOOD PRESSURE: 108 MMHG | DIASTOLIC BLOOD PRESSURE: 70 MMHG | BODY MASS INDEX: 34.07 KG/M2 | HEIGHT: 66 IN | WEIGHT: 212 LBS

## 2025-03-20 DIAGNOSIS — O21.0 MILD HYPEREMESIS GRAVIDARUM: ICD-10-CM

## 2025-03-20 DIAGNOSIS — Z34.91 ENCOUNTER FOR SUPERVISION OF NORMAL PREGNANCY, UNSPECIFIED, FIRST TRIMESTER: ICD-10-CM

## 2025-03-20 PROCEDURE — 76817 TRANSVAGINAL US OBSTETRIC: CPT

## 2025-03-20 PROCEDURE — 99203 OFFICE O/P NEW LOW 30 MIN: CPT | Mod: 25

## 2025-03-20 RX ORDER — DOXYLAMINE SUCCINATE AND PYRIDOXINE HYDROCHLORIDE 10; 10 MG/1; MG/1
10-10 TABLET, DELAYED RELEASE ORAL
Qty: 60 | Refills: 3 | Status: ACTIVE | COMMUNITY
Start: 2025-03-20 | End: 1900-01-01

## 2025-03-20 RX ORDER — ONDANSETRON 4 MG/1
4 TABLET ORAL
Qty: 90 | Refills: 3 | Status: ACTIVE | COMMUNITY
Start: 2025-03-20 | End: 1900-01-01

## 2025-03-21 LAB
ABORH: NORMAL
ANTIBODY SCREEN: NORMAL
BASOPHILS # BLD AUTO: 0.03 K/UL
BASOPHILS NFR BLD AUTO: 0.4 %
C TRACH RRNA SPEC QL NAA+PROBE: NOT DETECTED
EOSINOPHIL # BLD AUTO: 0.05 K/UL
EOSINOPHIL NFR BLD AUTO: 0.6 %
HCT VFR BLD CALC: 41.5 %
HCV AB SER QL: NONREACTIVE
HCV S/CO RATIO: 0.05 COI
HGB BLD-MCNC: 14 G/DL
IMM GRANULOCYTES NFR BLD AUTO: 0.4 %
LYMPHOCYTES # BLD AUTO: 1.39 K/UL
LYMPHOCYTES NFR BLD AUTO: 18 %
MAN DIFF?: NORMAL
MCHC RBC-ENTMCNC: 29.3 PG
MCHC RBC-ENTMCNC: 33.7 G/DL
MCV RBC AUTO: 86.8 FL
MONOCYTES # BLD AUTO: 0.57 K/UL
MONOCYTES NFR BLD AUTO: 7.4 %
N GONORRHOEA RRNA SPEC QL NAA+PROBE: NOT DETECTED
NEUTROPHILS # BLD AUTO: 5.66 K/UL
NEUTROPHILS NFR BLD AUTO: 73.2 %
PLATELET # BLD AUTO: 285 K/UL
PMV BLD AUTO: 0 /100 WBCS
PMV BLD: 11 FL
RBC # BLD: 4.78 M/UL
RBC # FLD: 12.7 %
SOURCE AMPLIFICATION: NORMAL
WBC # FLD AUTO: 7.73 K/UL

## 2025-03-22 LAB
HBV SURFACE AG SERPL QL IA: NONREACTIVE
HIV1+2 AB SPEC QL IA.RAPID: NONREACTIVE
MEV IGG FLD QL IA: >300 AU/ML
MEV IGG+IGM SER-IMP: POSITIVE
RUBV IGG FLD-ACNC: 5.13 INDEX
RUBV IGG SER-IMP: POSITIVE
T PALLIDUM AB SER QL IA: NEGATIVE

## 2025-03-26 LAB — FMR1 GENE MUT ANL BLD/T: NORMAL

## 2025-03-27 LAB — AR GENE MUT ANL BLD/T: NORMAL

## 2025-04-01 ENCOUNTER — NON-APPOINTMENT (OUTPATIENT)
Age: 35
End: 2025-04-01

## 2025-04-04 ENCOUNTER — APPOINTMENT (OUTPATIENT)
Dept: OBGYN | Facility: CLINIC | Age: 35
End: 2025-04-04
Payer: COMMERCIAL

## 2025-04-04 VITALS
BODY MASS INDEX: 33.91 KG/M2 | SYSTOLIC BLOOD PRESSURE: 121 MMHG | WEIGHT: 211 LBS | DIASTOLIC BLOOD PRESSURE: 85 MMHG | HEIGHT: 66 IN

## 2025-04-04 LAB
BILIRUB UR QL STRIP: NORMAL
GLUCOSE UR-MCNC: NORMAL
HCG UR QL: 0.2 EU/DL
HGB UR QL STRIP.AUTO: NORMAL
KETONES UR-MCNC: NORMAL
LEUKOCYTE ESTERASE UR QL STRIP: NORMAL
NITRITE UR QL STRIP: NORMAL
PH UR STRIP: 6.5
PROT UR STRIP-MCNC: NORMAL
SP GR UR STRIP: 1.02

## 2025-04-04 PROCEDURE — 0502F SUBSEQUENT PRENATAL CARE: CPT

## 2025-04-14 ENCOUNTER — NON-APPOINTMENT (OUTPATIENT)
Age: 35
End: 2025-04-14

## 2025-04-16 ENCOUNTER — TRANSCRIPTION ENCOUNTER (OUTPATIENT)
Age: 35
End: 2025-04-16

## 2025-04-16 ENCOUNTER — APPOINTMENT (OUTPATIENT)
Dept: ANTEPARTUM | Facility: CLINIC | Age: 35
End: 2025-04-16
Payer: COMMERCIAL

## 2025-04-16 ENCOUNTER — ASOB RESULT (OUTPATIENT)
Age: 35
End: 2025-04-16

## 2025-04-16 VITALS
DIASTOLIC BLOOD PRESSURE: 81 MMHG | BODY MASS INDEX: 34.39 KG/M2 | HEART RATE: 80 BPM | WEIGHT: 214 LBS | SYSTOLIC BLOOD PRESSURE: 125 MMHG | HEIGHT: 66 IN

## 2025-04-16 PROCEDURE — 76813 OB US NUCHAL MEAS 1 GEST: CPT

## 2025-04-16 PROCEDURE — 36415 COLL VENOUS BLD VENIPUNCTURE: CPT

## 2025-05-06 ENCOUNTER — NON-APPOINTMENT (OUTPATIENT)
Age: 35
End: 2025-05-06

## 2025-05-09 ENCOUNTER — APPOINTMENT (OUTPATIENT)
Dept: OBGYN | Facility: CLINIC | Age: 35
End: 2025-05-09
Payer: COMMERCIAL

## 2025-05-09 VITALS
SYSTOLIC BLOOD PRESSURE: 102 MMHG | WEIGHT: 214 LBS | BODY MASS INDEX: 34.39 KG/M2 | HEIGHT: 66 IN | DIASTOLIC BLOOD PRESSURE: 62 MMHG

## 2025-05-09 DIAGNOSIS — Z34.92 ENCOUNTER FOR SUPERVISION OF NORMAL PREGNANCY, UNSPECIFIED, SECOND TRIMESTER: ICD-10-CM

## 2025-05-09 LAB
BILIRUB UR QL STRIP: NORMAL
GLUCOSE UR-MCNC: NORMAL
HCG UR QL: 0.2 EU/DL
HGB UR QL STRIP.AUTO: NORMAL
KETONES UR-MCNC: NORMAL
LEUKOCYTE ESTERASE UR QL STRIP: NORMAL
NITRITE UR QL STRIP: NORMAL
PH UR STRIP: 7
PROT UR STRIP-MCNC: NORMAL
SP GR UR STRIP: 1.02

## 2025-05-09 PROCEDURE — 36415 COLL VENOUS BLD VENIPUNCTURE: CPT

## 2025-05-09 PROCEDURE — 0502F SUBSEQUENT PRENATAL CARE: CPT

## 2025-05-12 LAB
2ND TRIMESTER 4 SCREEN SERPL-IMP: NO
AFP ADJ MOM SERPL: 1.11
AFP INTERP SERPL-IMP: NORMAL
AFP INTERP SERPL-IMP: NORMAL
AFP MOM CUT-OFF: 2.5
AFP PERCENTILE: 62.4
AFP SERPL-ACNC: 28.28 NG/ML
CARBAMAZEPINE?: NO
CURRENT SMOKER: NO
DIABETES STATUS PATIENT: NO
GA: NORMAL
GESTATIONAL AGE METHOD: NORMAL
HX OF NTD NARR: NO
MULTIPLE PREGNANCY: NORMAL
NEURAL TUBE DEFECT RISK FETUS: NORMAL
NEURAL TUBE DEFECT RISK POP: NORMAL
TEST PERFORMANCE INFO SPEC: NORMAL
VALPROIC ACID?: NORMAL

## 2025-05-15 NOTE — ED ADULT TRIAGE NOTE - BSA (M2)
1.89 Detail Level: Detailed Quality 226: Preventive Care And Screening: Tobacco Use: Screening And Cessation Intervention: Patient screened for tobacco use and is an ex/non-smoker

## 2025-06-03 ENCOUNTER — NON-APPOINTMENT (OUTPATIENT)
Age: 35
End: 2025-06-03

## 2025-06-06 ENCOUNTER — APPOINTMENT (OUTPATIENT)
Dept: OBGYN | Facility: CLINIC | Age: 35
End: 2025-06-06
Payer: COMMERCIAL

## 2025-06-06 VITALS
HEIGHT: 66 IN | WEIGHT: 218 LBS | DIASTOLIC BLOOD PRESSURE: 70 MMHG | SYSTOLIC BLOOD PRESSURE: 107 MMHG | BODY MASS INDEX: 35.03 KG/M2

## 2025-06-06 LAB
BILIRUB UR QL STRIP: NORMAL
GLUCOSE UR-MCNC: NORMAL
HCG UR QL: 0.2 EU/DL
HGB UR QL STRIP.AUTO: NORMAL
KETONES UR-MCNC: NORMAL
LEUKOCYTE ESTERASE UR QL STRIP: NORMAL
NITRITE UR QL STRIP: NORMAL
PH UR STRIP: 6
PROT UR STRIP-MCNC: 30
SP GR UR STRIP: 1.02

## 2025-06-06 PROCEDURE — 0502F SUBSEQUENT PRENATAL CARE: CPT

## 2025-06-09 ENCOUNTER — ASOB RESULT (OUTPATIENT)
Age: 35
End: 2025-06-09

## 2025-06-09 ENCOUNTER — APPOINTMENT (OUTPATIENT)
Dept: ANTEPARTUM | Facility: CLINIC | Age: 35
End: 2025-06-09
Payer: COMMERCIAL

## 2025-06-09 VITALS
HEART RATE: 93 BPM | SYSTOLIC BLOOD PRESSURE: 102 MMHG | WEIGHT: 218 LBS | HEIGHT: 66 IN | BODY MASS INDEX: 35.03 KG/M2 | DIASTOLIC BLOOD PRESSURE: 73 MMHG

## 2025-06-09 PROCEDURE — 76817 TRANSVAGINAL US OBSTETRIC: CPT

## 2025-06-09 PROCEDURE — 76811 OB US DETAILED SNGL FETUS: CPT

## 2025-07-11 ENCOUNTER — APPOINTMENT (OUTPATIENT)
Dept: OBGYN | Facility: CLINIC | Age: 35
End: 2025-07-11
Payer: COMMERCIAL

## 2025-07-11 VITALS
HEIGHT: 66 IN | BODY MASS INDEX: 35.68 KG/M2 | WEIGHT: 222 LBS | SYSTOLIC BLOOD PRESSURE: 114 MMHG | DIASTOLIC BLOOD PRESSURE: 77 MMHG

## 2025-07-11 LAB
BASOPHILS # BLD AUTO: 0.02 K/UL
BASOPHILS NFR BLD AUTO: 0.3 %
BILIRUB UR QL STRIP: NORMAL
EOSINOPHIL # BLD AUTO: 0.09 K/UL
EOSINOPHIL NFR BLD AUTO: 1.2 %
GLUCOSE UR-MCNC: NORMAL
HCG UR QL: 2 EU/DL
HCT VFR BLD CALC: 39.3 %
HGB BLD-MCNC: 12.8 G/DL
HGB UR QL STRIP.AUTO: NORMAL
IMM GRANULOCYTES NFR BLD AUTO: 1.5 %
KETONES UR-MCNC: NORMAL
LEUKOCYTE ESTERASE UR QL STRIP: NORMAL
LYMPHOCYTES # BLD AUTO: 0.99 K/UL
LYMPHOCYTES NFR BLD AUTO: 13.3 %
MAN DIFF?: NORMAL
MCHC RBC-ENTMCNC: 29.3 PG
MCHC RBC-ENTMCNC: 32.6 G/DL
MCV RBC AUTO: 89.9 FL
MONOCYTES # BLD AUTO: 0.48 K/UL
MONOCYTES NFR BLD AUTO: 6.4 %
NEUTROPHILS # BLD AUTO: 5.77 K/UL
NEUTROPHILS NFR BLD AUTO: 77.3 %
NITRITE UR QL STRIP: NORMAL
PH UR STRIP: 6.5
PLATELET # BLD AUTO: 190 K/UL
PMV BLD AUTO: 0 /100 WBCS
PMV BLD: 10.9 FL
PROT UR STRIP-MCNC: 30
RBC # BLD: 4.37 M/UL
RBC # FLD: 13.7 %
SP GR UR STRIP: 1.02
WBC # FLD AUTO: 7.46 K/UL

## 2025-07-11 PROCEDURE — 0502F SUBSEQUENT PRENATAL CARE: CPT

## 2025-07-11 PROCEDURE — 36415 COLL VENOUS BLD VENIPUNCTURE: CPT

## 2025-07-14 PROBLEM — Z3A.25 25 WEEKS GESTATION OF PREGNANCY: Status: ACTIVE | Noted: 2025-07-14

## 2025-07-14 LAB — GLUCOSE 1H P 50 G GLC PO SERPL-MCNC: 151 MG/DL

## 2025-07-18 ENCOUNTER — NON-APPOINTMENT (OUTPATIENT)
Age: 35
End: 2025-07-18

## 2025-08-08 ENCOUNTER — APPOINTMENT (OUTPATIENT)
Dept: OBGYN | Facility: CLINIC | Age: 35
End: 2025-08-08
Payer: COMMERCIAL

## 2025-08-08 VITALS
BODY MASS INDEX: 35.68 KG/M2 | DIASTOLIC BLOOD PRESSURE: 86 MMHG | WEIGHT: 222 LBS | HEIGHT: 66 IN | SYSTOLIC BLOOD PRESSURE: 125 MMHG

## 2025-08-08 LAB
BILIRUB UR QL STRIP: NORMAL
GLUCOSE UR-MCNC: NORMAL
HCG UR QL: 1 EU/DL
HGB UR QL STRIP.AUTO: NORMAL
KETONES UR-MCNC: NORMAL
LEUKOCYTE ESTERASE UR QL STRIP: NORMAL
NITRITE UR QL STRIP: NORMAL
PH UR STRIP: 6
PROT UR STRIP-MCNC: NORMAL
SP GR UR STRIP: 1.03

## 2025-08-08 PROCEDURE — 0502F SUBSEQUENT PRENATAL CARE: CPT

## 2025-08-18 ENCOUNTER — APPOINTMENT (OUTPATIENT)
Dept: OBGYN | Facility: CLINIC | Age: 35
End: 2025-08-18
Payer: COMMERCIAL

## 2025-08-18 VITALS
WEIGHT: 225 LBS | SYSTOLIC BLOOD PRESSURE: 111 MMHG | DIASTOLIC BLOOD PRESSURE: 72 MMHG | BODY MASS INDEX: 36.16 KG/M2 | HEIGHT: 66 IN

## 2025-08-18 DIAGNOSIS — O99.210 OBESITY COMPLICATING PREGNANCY, UNSPECIFIED TRIMESTER: ICD-10-CM

## 2025-08-18 LAB
BILIRUB UR QL STRIP: NORMAL
GLUCOSE UR-MCNC: NORMAL
HCG UR QL: 1 EU/DL
HGB UR QL STRIP.AUTO: NORMAL
KETONES UR-MCNC: NORMAL
LEUKOCYTE ESTERASE UR QL STRIP: NORMAL
NITRITE UR QL STRIP: NORMAL
PH UR STRIP: 7
PROT UR STRIP-MCNC: NORMAL
SP GR UR STRIP: 1.02

## 2025-08-18 PROCEDURE — 0502F SUBSEQUENT PRENATAL CARE: CPT

## 2025-09-03 ENCOUNTER — APPOINTMENT (OUTPATIENT)
Dept: ANTEPARTUM | Facility: CLINIC | Age: 35
End: 2025-09-03
Payer: COMMERCIAL

## 2025-09-03 ENCOUNTER — ASOB RESULT (OUTPATIENT)
Age: 35
End: 2025-09-03

## 2025-09-03 VITALS
WEIGHT: 224 LBS | HEART RATE: 106 BPM | BODY MASS INDEX: 36 KG/M2 | HEIGHT: 66 IN | DIASTOLIC BLOOD PRESSURE: 74 MMHG | SYSTOLIC BLOOD PRESSURE: 127 MMHG

## 2025-09-03 PROCEDURE — 76816 OB US FOLLOW-UP PER FETUS: CPT

## 2025-09-12 ENCOUNTER — APPOINTMENT (OUTPATIENT)
Dept: OBGYN | Facility: CLINIC | Age: 35
End: 2025-09-12
Payer: COMMERCIAL

## 2025-09-12 VITALS
SYSTOLIC BLOOD PRESSURE: 123 MMHG | WEIGHT: 226 LBS | DIASTOLIC BLOOD PRESSURE: 73 MMHG | HEIGHT: 66 IN | BODY MASS INDEX: 36.32 KG/M2

## 2025-09-12 LAB
BILIRUB UR QL STRIP: NORMAL
GLUCOSE UR-MCNC: NORMAL
HCG UR QL: 0.2 EU/DL
HGB UR QL STRIP.AUTO: NORMAL
KETONES UR-MCNC: NORMAL
LEUKOCYTE ESTERASE UR QL STRIP: NORMAL
NITRITE UR QL STRIP: NORMAL
PH UR STRIP: 7
PROT UR STRIP-MCNC: NORMAL
SP GR UR STRIP: 1.01

## 2025-09-12 PROCEDURE — 0502F SUBSEQUENT PRENATAL CARE: CPT

## 2025-09-18 ENCOUNTER — ASOB RESULT (OUTPATIENT)
Age: 35
End: 2025-09-18

## 2025-09-18 ENCOUNTER — APPOINTMENT (OUTPATIENT)
Dept: ANTEPARTUM | Facility: CLINIC | Age: 35
End: 2025-09-18
Payer: COMMERCIAL

## 2025-09-18 VITALS
DIASTOLIC BLOOD PRESSURE: 71 MMHG | HEIGHT: 66 IN | BODY MASS INDEX: 36.64 KG/M2 | SYSTOLIC BLOOD PRESSURE: 108 MMHG | HEART RATE: 89 BPM | WEIGHT: 228 LBS

## 2025-09-18 PROCEDURE — 76819 FETAL BIOPHYS PROFIL W/O NST: CPT
